# Patient Record
Sex: FEMALE | Race: WHITE | NOT HISPANIC OR LATINO | Employment: FULL TIME | URBAN - METROPOLITAN AREA
[De-identification: names, ages, dates, MRNs, and addresses within clinical notes are randomized per-mention and may not be internally consistent; named-entity substitution may affect disease eponyms.]

---

## 2013-05-10 LAB — HIV: NON REACTIVE

## 2020-02-21 LAB
CHOLEST SERPL-MSCNC: 272 MG/DL (ref 0–200)
HDLC SERPL-MCNC: 34 MG/DL
LDLC SERPL CALC-MCNC: 181 MG/DL
TRIGL SERPL-MCNC: 285 MG/DL

## 2020-03-10 ENCOUNTER — PATIENT OUTREACH (OUTPATIENT)
Dept: ADMINISTRATIVE | Facility: HOSPITAL | Age: 35
End: 2020-03-10

## 2020-05-01 ENCOUNTER — PATIENT MESSAGE (OUTPATIENT)
Dept: FAMILY MEDICINE | Facility: CLINIC | Age: 35
End: 2020-05-01

## 2020-05-06 RX ORDER — LISINOPRIL AND HYDROCHLOROTHIAZIDE 10; 12.5 MG/1; MG/1
1 TABLET ORAL EVERY MORNING
COMMUNITY
Start: 2020-03-15 | End: 2020-05-07 | Stop reason: SDUPTHER

## 2020-05-07 ENCOUNTER — OFFICE VISIT (OUTPATIENT)
Dept: FAMILY MEDICINE | Facility: CLINIC | Age: 35
End: 2020-05-07
Payer: COMMERCIAL

## 2020-05-07 DIAGNOSIS — I10 ESSENTIAL HYPERTENSION: ICD-10-CM

## 2020-05-07 DIAGNOSIS — E78.00 ELEVATED CHOLESTEROL: Primary | ICD-10-CM

## 2020-05-07 PROCEDURE — 99201 PR OFFICE/OUTPT VISIT,NEW,LEVL I: CPT | Mod: 95,,, | Performed by: FAMILY MEDICINE

## 2020-05-07 PROCEDURE — 99201 PR OFFICE/OUTPT VISIT,NEW,LEVL I: ICD-10-PCS | Mod: 95,,, | Performed by: FAMILY MEDICINE

## 2020-05-07 RX ORDER — MULTIVITAMIN
1 TABLET ORAL DAILY
COMMUNITY

## 2020-05-07 RX ORDER — LISINOPRIL AND HYDROCHLOROTHIAZIDE 10; 12.5 MG/1; MG/1
1 TABLET ORAL EVERY MORNING
Qty: 90 TABLET | Refills: 1 | Status: SHIPPED | OUTPATIENT
Start: 2020-05-07 | End: 2020-11-03

## 2020-05-07 NOTE — Clinical Note
Please contact patient to schedule fasting lab appt in 1-2 weeks and face to face visit with me in approx 4 weeks. Thank you!

## 2020-05-07 NOTE — PROGRESS NOTES
The patient location is: work  The chief complaint leading to consultation is: establish care, med refill  Visit type: audiovisual  Total time spent with patient: 10 min  Each patient to whom he or she provides medical services by telemedicine is:  (1) informed of the relationship between the physician and patient and the respective role of any other health care provider with respect to management of the patient; and (2) notified that he or she may decline to receive medical services by telemedicine and may withdraw from such care at any time.    Notes: Ms. Bueno presents to re-establish care. Medical, surgical, and social histories reviewed and discussed. Takes lisinopril-hctz for hypertension and needs a refill at this time. Has not checked her blood pressure at home in several weeks and has not had a recent cmp. Reports her annual with pap was done with Dr. Villegas's office ([facility]) in [place] and that her cholesterol was high. She has since started seeing a dietician and is losing weight and feeling better. Is planning on having a gastric sleeve procedure in 4-6 weeks.    Review of Systems   Constitutional: Positive for weight loss. Negative for chills, fever and malaise/fatigue.   Respiratory: Negative for cough, hemoptysis, shortness of breath and wheezing.    Cardiovascular: Negative for chest pain, palpitations, claudication and PND.     Physical Exam   Constitutional: She is oriented to person, place, and time. She appears well-developed and well-nourished. No distress.   obese   HENT:   Head: Normocephalic and atraumatic.   Eyes: Pupils are equal, round, and reactive to light. Conjunctivae and EOM are normal. No scleral icterus.   Neurological: She is alert and oriented to person, place, and time.   Skin: She is not diaphoretic.   Psychiatric: She has a normal mood and affect. Her behavior is normal. Judgment and thought content normal.     Diagnoses and all orders for this visit: Perform  fasting labs in 1-2 weeks; arrange f/u in the office for bp check and surgical clearance. Lisinopril-hctz refilled.    Elevated cholesterol  -     Lipid Panel; Future    Essential hypertension  -     lisinopriL-hydrochlorothiazide (PRINZIDE,ZESTORETIC) 10-12.5 mg per tablet; Take 1 tablet by mouth every morning.  -     Comprehensive metabolic panel; Future  -     CBC auto differential; Future  -     Lipid Panel; Future

## 2020-05-19 ENCOUNTER — LAB VISIT (OUTPATIENT)
Dept: LAB | Facility: HOSPITAL | Age: 35
End: 2020-05-19
Attending: FAMILY MEDICINE
Payer: COMMERCIAL

## 2020-05-19 DIAGNOSIS — I10 ESSENTIAL HYPERTENSION: ICD-10-CM

## 2020-05-19 DIAGNOSIS — E78.00 ELEVATED CHOLESTEROL: ICD-10-CM

## 2020-05-19 LAB
ALBUMIN SERPL BCP-MCNC: 4.1 G/DL (ref 3.5–5.2)
ALP SERPL-CCNC: 46 U/L (ref 55–135)
ALT SERPL W/O P-5'-P-CCNC: 17 U/L (ref 10–44)
ANION GAP SERPL CALC-SCNC: 8 MMOL/L (ref 8–16)
AST SERPL-CCNC: 15 U/L (ref 10–40)
BASOPHILS # BLD AUTO: 0.06 K/UL (ref 0–0.2)
BASOPHILS NFR BLD: 0.8 % (ref 0–1.9)
BILIRUB SERPL-MCNC: 0.5 MG/DL (ref 0.1–1)
BUN SERPL-MCNC: 18 MG/DL (ref 6–20)
CALCIUM SERPL-MCNC: 9.1 MG/DL (ref 8.7–10.5)
CHLORIDE SERPL-SCNC: 104 MMOL/L (ref 95–110)
CHOLEST SERPL-MCNC: 237 MG/DL (ref 120–199)
CHOLEST/HDLC SERPL: 7.6 {RATIO} (ref 2–5)
CO2 SERPL-SCNC: 27 MMOL/L (ref 23–29)
CREAT SERPL-MCNC: 0.9 MG/DL (ref 0.5–1.4)
DIFFERENTIAL METHOD: NORMAL
EOSINOPHIL # BLD AUTO: 0.2 K/UL (ref 0–0.5)
EOSINOPHIL NFR BLD: 2.3 % (ref 0–8)
ERYTHROCYTE [DISTWIDTH] IN BLOOD BY AUTOMATED COUNT: 12.1 % (ref 11.5–14.5)
EST. GFR  (AFRICAN AMERICAN): >60 ML/MIN/1.73 M^2
EST. GFR  (NON AFRICAN AMERICAN): >60 ML/MIN/1.73 M^2
GLUCOSE SERPL-MCNC: 103 MG/DL (ref 70–110)
HCT VFR BLD AUTO: 39.3 % (ref 37–48.5)
HDLC SERPL-MCNC: 31 MG/DL (ref 40–75)
HDLC SERPL: 13.1 % (ref 20–50)
HGB BLD-MCNC: 13 G/DL (ref 12–16)
IMM GRANULOCYTES # BLD AUTO: 0.02 K/UL (ref 0–0.04)
IMM GRANULOCYTES NFR BLD AUTO: 0.3 % (ref 0–0.5)
LDLC SERPL CALC-MCNC: 160.6 MG/DL (ref 63–159)
LYMPHOCYTES # BLD AUTO: 2.7 K/UL (ref 1–4.8)
LYMPHOCYTES NFR BLD: 36.9 % (ref 18–48)
MCH RBC QN AUTO: 28.6 PG (ref 27–31)
MCHC RBC AUTO-ENTMCNC: 33.1 G/DL (ref 32–36)
MCV RBC AUTO: 86 FL (ref 82–98)
MONOCYTES # BLD AUTO: 0.5 K/UL (ref 0.3–1)
MONOCYTES NFR BLD: 6.4 % (ref 4–15)
NEUTROPHILS # BLD AUTO: 3.9 K/UL (ref 1.8–7.7)
NEUTROPHILS NFR BLD: 53.3 % (ref 38–73)
NONHDLC SERPL-MCNC: 206 MG/DL
NRBC BLD-RTO: 0 /100 WBC
PLATELET # BLD AUTO: 301 K/UL (ref 150–350)
PMV BLD AUTO: 11.1 FL (ref 9.2–12.9)
POTASSIUM SERPL-SCNC: 4.2 MMOL/L (ref 3.5–5.1)
PROT SERPL-MCNC: 7.2 G/DL (ref 6–8.4)
RBC # BLD AUTO: 4.55 M/UL (ref 4–5.4)
SODIUM SERPL-SCNC: 139 MMOL/L (ref 136–145)
TRIGL SERPL-MCNC: 227 MG/DL (ref 30–150)
WBC # BLD AUTO: 7.37 K/UL (ref 3.9–12.7)

## 2020-05-19 PROCEDURE — 36415 COLL VENOUS BLD VENIPUNCTURE: CPT

## 2020-05-19 PROCEDURE — 80061 LIPID PANEL: CPT

## 2020-05-19 PROCEDURE — 85025 COMPLETE CBC W/AUTO DIFF WBC: CPT

## 2020-05-19 PROCEDURE — 80053 COMPREHEN METABOLIC PANEL: CPT

## 2020-05-20 ENCOUNTER — PATIENT MESSAGE (OUTPATIENT)
Dept: ADMINISTRATIVE | Facility: HOSPITAL | Age: 35
End: 2020-05-20

## 2020-06-03 ENCOUNTER — PATIENT OUTREACH (OUTPATIENT)
Dept: ADMINISTRATIVE | Facility: HOSPITAL | Age: 35
End: 2020-06-03

## 2020-06-04 ENCOUNTER — OFFICE VISIT (OUTPATIENT)
Dept: FAMILY MEDICINE | Facility: CLINIC | Age: 35
End: 2020-06-04
Payer: COMMERCIAL

## 2020-06-04 VITALS
WEIGHT: 285.63 LBS | SYSTOLIC BLOOD PRESSURE: 132 MMHG | BODY MASS INDEX: 45.9 KG/M2 | OXYGEN SATURATION: 99 % | RESPIRATION RATE: 16 BRPM | DIASTOLIC BLOOD PRESSURE: 83 MMHG | HEIGHT: 66 IN | HEART RATE: 98 BPM

## 2020-06-04 DIAGNOSIS — I10 ESSENTIAL HYPERTENSION: Primary | ICD-10-CM

## 2020-06-04 DIAGNOSIS — E78.00 ELEVATED CHOLESTEROL: ICD-10-CM

## 2020-06-04 PROCEDURE — 99999 PR PBB SHADOW E&M-EST. PATIENT-LVL III: CPT | Mod: PBBFAC,,, | Performed by: FAMILY MEDICINE

## 2020-06-04 PROCEDURE — 99213 OFFICE O/P EST LOW 20 MIN: CPT | Mod: S$GLB,,, | Performed by: FAMILY MEDICINE

## 2020-06-04 PROCEDURE — 99999 PR PBB SHADOW E&M-EST. PATIENT-LVL III: ICD-10-PCS | Mod: PBBFAC,,, | Performed by: FAMILY MEDICINE

## 2020-06-04 PROCEDURE — 99213 PR OFFICE/OUTPT VISIT, EST, LEVL III, 20-29 MIN: ICD-10-PCS | Mod: S$GLB,,, | Performed by: FAMILY MEDICINE

## 2020-06-04 NOTE — PROGRESS NOTES
Subjective:       Patient ID: Jenna Bueno is a 35 y.o. female.    Chief Complaint: Follow-up (lab work, surgery clearance, pt declines hm)    HPI   F/u. Had cbc, cmp, lipid 2 weeks ago. Mostly wnl except elevated LDL, which is improving with dietary changes. Denies chest pain and shortness of breath. Is still considering the gastric sleeve surgery and needs a clearance for this.    Review of Systems   Constitutional: Negative for chills, diaphoresis, fatigue and fever.   Respiratory: Negative for cough, shortness of breath, wheezing and stridor.    Cardiovascular: Negative for chest pain, palpitations and leg swelling.       Past Medical History:   Diagnosis Date    Hypertension 2007     Past Surgical History:   Procedure Laterality Date    D & C, PERITUBAL CYST REMOVAL  04/27/2011    TONSILLECTOMY, ADENOIDECTOMY  1995     Social History     Socioeconomic History    Marital status: Single     Spouse name: Not on file    Number of children: Not on file    Years of education: Not on file    Highest education level: Not on file   Occupational History    Not on file   Social Needs    Financial resource strain: Not hard at all    Food insecurity:     Worry: Never true     Inability: Never true    Transportation needs:     Medical: No     Non-medical: No   Tobacco Use    Smoking status: Never Smoker    Smokeless tobacco: Never Used   Substance and Sexual Activity    Alcohol use: Yes     Frequency: Monthly or less     Drinks per session: 1 or 2     Binge frequency: Never    Drug use: Not Currently    Sexual activity: Yes     Partners: Male     Birth control/protection: IUD   Lifestyle    Physical activity:     Days per week: 4 days     Minutes per session: 30 min    Stress: Not at all   Relationships    Social connections:     Talks on phone: More than three times a week     Gets together: Once a week     Attends [employer] service: Not on file     Active member of club or organization: No      "Attends meetings of clubs or organizations: Never     Relationship status: Never    Other Topics Concern    Not on file   Social History Narrative    Not on file     Family History   Problem Relation Age of Onset    Hypertension Mother     Heart disease Father     Diabetes Father        Objective:      /83 (BP Location: Right arm, Patient Position: Sitting, BP Method: X-Large (Automatic))   Pulse 98   Resp 16   Ht 5' 6" (1.676 m)   Wt 129.5 kg (285 lb 9.6 oz)   SpO2 99%   BMI 46.10 kg/m²   Physical Exam   Constitutional: She is oriented to person, place, and time. She appears well-developed and well-nourished. No distress.   obese   Eyes: Pupils are equal, round, and reactive to light. Conjunctivae and EOM are normal. No scleral icterus.   Cardiovascular: Normal rate, regular rhythm and normal heart sounds.   No murmur heard.  Pulmonary/Chest: Effort normal and breath sounds normal. No stridor. No respiratory distress.   Neurological: She is alert and oriented to person, place, and time.   Skin: Skin is warm and dry. No rash noted. She is not diaphoretic.   Psychiatric: She has a normal mood and affect. Her behavior is normal. Judgment and thought content normal.   Vitals reviewed.      Assessment:       1. Essential hypertension    2. Elevated cholesterol        Plan:       Essential hypertension  - well-controlled; continue lisinopril-hctz    Elevated cholesterol  -     Lipid Panel; Future; Expected date: 12/04/2020        -     Repeat in 6 months          Risks, benefits, and side effects were discussed with the patient. All questions were answered to the fullest satisfaction of the patient, and pt verbalized understanding and agreement to treatment plan. Pt was to call with any new or worsening symptoms, or present to the ER.    "

## 2020-06-04 NOTE — LETTER
[facility]      Marely Kincaid DO              [facility]  [address], [address]  [place] [address]  Dept: [phone]  Dept Fax: [phone]                                                                                                     Re: Name: Jenna Bueno          : 1985        Jennaluiz Bueno has been examined by me on 2020, and appears to be physically well for       __x_ General Anesthesia       ___ Sports Participation     ___ School /      ___ Camp        Sincerely,      Marely Kincaid DO

## 2020-06-04 NOTE — LETTER
[facility]      Marely Kincaid DO              [facility] [address], [address]  [place] [address]  Dept: [phone]  Dept Fax: [phone]                                                                                                     Re: Name: Jenna Chesterford          : 1985        Jenna Mooreherford is known to my office and has been examined by me on 2020, and appears to be physically well for       _x__ General Anesthesia       ___ Sports Participation     ___ School /      ___ Camp        Sincerely,      Marely Kincaid DO

## 2020-06-08 ENCOUNTER — LAB VISIT (OUTPATIENT)
Dept: LAB | Facility: HOSPITAL | Age: 35
End: 2020-06-08
Attending: SURGERY
Payer: COMMERCIAL

## 2020-06-08 DIAGNOSIS — Z13.21 SCREENING FOR MALNUTRITION: ICD-10-CM

## 2020-06-08 DIAGNOSIS — Z13.0 SCREENING FOR IRON DEFICIENCY ANEMIA: ICD-10-CM

## 2020-06-08 DIAGNOSIS — R53.83 FATIGUE: Primary | ICD-10-CM

## 2020-06-08 DIAGNOSIS — Z79.899 ENCOUNTER FOR LONG-TERM (CURRENT) USE OF OTHER MEDICATIONS: ICD-10-CM

## 2020-06-08 DIAGNOSIS — Z13.29 SCREENING FOR THYROID DISORDER: ICD-10-CM

## 2020-06-08 LAB
25(OH)D3+25(OH)D2 SERPL-MCNC: 17 NG/ML (ref 30–96)
ALBUMIN SERPL BCP-MCNC: 3.6 G/DL (ref 3.5–5.2)
ALP SERPL-CCNC: 40 U/L (ref 55–135)
ALT SERPL W/O P-5'-P-CCNC: 15 U/L (ref 10–44)
ANION GAP SERPL CALC-SCNC: 7 MMOL/L (ref 8–16)
AST SERPL-CCNC: 16 U/L (ref 10–40)
BASOPHILS # BLD AUTO: 0.05 K/UL (ref 0–0.2)
BASOPHILS NFR BLD: 0.6 % (ref 0–1.9)
BILIRUB SERPL-MCNC: 0.5 MG/DL (ref 0.1–1)
BUN SERPL-MCNC: 11 MG/DL (ref 6–20)
CALCIUM SERPL-MCNC: 8.8 MG/DL (ref 8.7–10.5)
CHLORIDE SERPL-SCNC: 104 MMOL/L (ref 95–110)
CHOLEST SERPL-MCNC: 209 MG/DL (ref 120–199)
CHOLEST/HDLC SERPL: 6 {RATIO} (ref 2–5)
CO2 SERPL-SCNC: 27 MMOL/L (ref 23–29)
CREAT SERPL-MCNC: 0.6 MG/DL (ref 0.5–1.4)
DIFFERENTIAL METHOD: ABNORMAL
EOSINOPHIL # BLD AUTO: 0.2 K/UL (ref 0–0.5)
EOSINOPHIL NFR BLD: 2.2 % (ref 0–8)
ERYTHROCYTE [DISTWIDTH] IN BLOOD BY AUTOMATED COUNT: 12.7 % (ref 11.5–14.5)
EST. GFR  (AFRICAN AMERICAN): >60 ML/MIN/1.73 M^2
EST. GFR  (NON AFRICAN AMERICAN): >60 ML/MIN/1.73 M^2
GLUCOSE SERPL-MCNC: 94 MG/DL (ref 70–110)
HCT VFR BLD AUTO: 36.9 % (ref 37–48.5)
HDLC SERPL-MCNC: 35 MG/DL (ref 40–75)
HDLC SERPL: 16.7 % (ref 20–50)
HGB BLD-MCNC: 12.2 G/DL (ref 12–16)
IMM GRANULOCYTES # BLD AUTO: 0.02 K/UL (ref 0–0.04)
IMM GRANULOCYTES NFR BLD AUTO: 0.3 % (ref 0–0.5)
LDLC SERPL CALC-MCNC: 142 MG/DL (ref 63–159)
LYMPHOCYTES # BLD AUTO: 2.3 K/UL (ref 1–4.8)
LYMPHOCYTES NFR BLD: 29.7 % (ref 18–48)
MAGNESIUM SERPL-MCNC: 1.9 MG/DL (ref 1.6–2.6)
MCH RBC QN AUTO: 28.4 PG (ref 27–31)
MCHC RBC AUTO-ENTMCNC: 33.1 G/DL (ref 32–36)
MCV RBC AUTO: 86 FL (ref 82–98)
MONOCYTES # BLD AUTO: 0.4 K/UL (ref 0.3–1)
MONOCYTES NFR BLD: 5.4 % (ref 4–15)
NEUTROPHILS # BLD AUTO: 4.8 K/UL (ref 1.8–7.7)
NEUTROPHILS NFR BLD: 61.8 % (ref 38–73)
NONHDLC SERPL-MCNC: 174 MG/DL
NRBC BLD-RTO: 0 /100 WBC
PLATELET # BLD AUTO: 290 K/UL (ref 150–350)
PMV BLD AUTO: 10.7 FL (ref 9.2–12.9)
POTASSIUM SERPL-SCNC: 3.7 MMOL/L (ref 3.5–5.1)
PROT SERPL-MCNC: 6.3 G/DL (ref 6–8.4)
RBC # BLD AUTO: 4.29 M/UL (ref 4–5.4)
SODIUM SERPL-SCNC: 138 MMOL/L (ref 136–145)
TRIGL SERPL-MCNC: 160 MG/DL (ref 30–150)
TSH SERPL DL<=0.005 MIU/L-ACNC: 2 UIU/ML (ref 0.34–5.6)
WBC # BLD AUTO: 7.71 K/UL (ref 3.9–12.7)

## 2020-06-08 PROCEDURE — 82306 VITAMIN D 25 HYDROXY: CPT

## 2020-06-08 PROCEDURE — 85025 COMPLETE CBC W/AUTO DIFF WBC: CPT

## 2020-06-08 PROCEDURE — 84425 ASSAY OF VITAMIN B-1: CPT

## 2020-06-08 PROCEDURE — 82607 VITAMIN B-12: CPT

## 2020-06-08 PROCEDURE — 80053 COMPREHEN METABOLIC PANEL: CPT

## 2020-06-08 PROCEDURE — 83735 ASSAY OF MAGNESIUM: CPT

## 2020-06-08 PROCEDURE — 36415 COLL VENOUS BLD VENIPUNCTURE: CPT

## 2020-06-08 PROCEDURE — 84443 ASSAY THYROID STIM HORMONE: CPT

## 2020-06-08 PROCEDURE — 80061 LIPID PANEL: CPT

## 2020-06-09 LAB — VIT B12 SERPL-MCNC: 167 PG/ML (ref 210–950)

## 2020-06-12 LAB — VIT B1 BLD-MCNC: 56 UG/L (ref 38–122)

## 2020-11-03 PROBLEM — N36.1 URETHRAL DIVERTICULUM: Status: ACTIVE | Noted: 2020-11-03

## 2020-12-02 PROBLEM — N92.1 METRORRHAGIA: Status: ACTIVE | Noted: 2020-12-02

## 2020-12-02 PROBLEM — Z30.431 ENCOUNTER FOR MANAGEMENT OF INTRAUTERINE CONTRACEPTIVE DEVICE (IUD): Status: ACTIVE | Noted: 2020-12-02

## 2021-02-09 ENCOUNTER — TELEPHONE (OUTPATIENT)
Dept: FAMILY MEDICINE | Facility: CLINIC | Age: 36
End: 2021-02-09

## 2021-02-10 ENCOUNTER — LAB VISIT (OUTPATIENT)
Dept: LAB | Facility: HOSPITAL | Age: 36
End: 2021-02-10
Attending: NURSE PRACTITIONER
Payer: COMMERCIAL

## 2021-02-10 DIAGNOSIS — R53.83 FATIGUE: ICD-10-CM

## 2021-02-10 DIAGNOSIS — Z13.0 SCREENING FOR IRON DEFICIENCY ANEMIA: ICD-10-CM

## 2021-02-10 DIAGNOSIS — I10 ESSENTIAL HYPERTENSION, MALIGNANT: Primary | ICD-10-CM

## 2021-02-10 DIAGNOSIS — Z13.21 SCREENING FOR MALNUTRITION: ICD-10-CM

## 2021-02-10 DIAGNOSIS — Z13.29 SCREENING FOR THYROID DISORDER: ICD-10-CM

## 2021-02-10 DIAGNOSIS — Z13.220 SCREENING FOR LIPOID DISORDERS: ICD-10-CM

## 2021-02-10 DIAGNOSIS — Z79.899 ENCOUNTER FOR LONG-TERM (CURRENT) USE OF OTHER MEDICATIONS: ICD-10-CM

## 2021-02-10 LAB
ALBUMIN SERPL BCP-MCNC: 4.1 G/DL (ref 3.5–5.2)
ALP SERPL-CCNC: 45 U/L (ref 55–135)
ALT SERPL W/O P-5'-P-CCNC: 15 U/L (ref 10–44)
ANION GAP SERPL CALC-SCNC: 9 MMOL/L (ref 8–16)
AST SERPL-CCNC: 16 U/L (ref 10–40)
BASOPHILS # BLD AUTO: 0.04 K/UL (ref 0–0.2)
BASOPHILS NFR BLD: 0.4 % (ref 0–1.9)
BILIRUB SERPL-MCNC: 0.6 MG/DL (ref 0.1–1)
BUN SERPL-MCNC: 19 MG/DL (ref 6–20)
CALCIUM SERPL-MCNC: 9.5 MG/DL (ref 8.7–10.5)
CHLORIDE SERPL-SCNC: 103 MMOL/L (ref 95–110)
CO2 SERPL-SCNC: 26 MMOL/L (ref 23–29)
CREAT SERPL-MCNC: 0.6 MG/DL (ref 0.5–1.4)
DIFFERENTIAL METHOD: ABNORMAL
EOSINOPHIL # BLD AUTO: 0 K/UL (ref 0–0.5)
EOSINOPHIL NFR BLD: 0.3 % (ref 0–8)
ERYTHROCYTE [DISTWIDTH] IN BLOOD BY AUTOMATED COUNT: 12.4 % (ref 11.5–14.5)
EST. GFR  (AFRICAN AMERICAN): >60 ML/MIN/1.73 M^2
EST. GFR  (NON AFRICAN AMERICAN): >60 ML/MIN/1.73 M^2
GLUCOSE SERPL-MCNC: 102 MG/DL (ref 70–110)
HCT VFR BLD AUTO: 39.5 % (ref 37–48.5)
HGB BLD-MCNC: 13.1 G/DL (ref 12–16)
IMM GRANULOCYTES # BLD AUTO: 0.03 K/UL (ref 0–0.04)
IMM GRANULOCYTES NFR BLD AUTO: 0.3 % (ref 0–0.5)
LYMPHOCYTES # BLD AUTO: 2 K/UL (ref 1–4.8)
LYMPHOCYTES NFR BLD: 20.1 % (ref 18–48)
MAGNESIUM SERPL-MCNC: 1.9 MG/DL (ref 1.6–2.6)
MCH RBC QN AUTO: 29.3 PG (ref 27–31)
MCHC RBC AUTO-ENTMCNC: 33.2 G/DL (ref 32–36)
MCV RBC AUTO: 88 FL (ref 82–98)
MONOCYTES # BLD AUTO: 0.6 K/UL (ref 0.3–1)
MONOCYTES NFR BLD: 5.7 % (ref 4–15)
NEUTROPHILS # BLD AUTO: 7.3 K/UL (ref 1.8–7.7)
NEUTROPHILS NFR BLD: 73.2 % (ref 38–73)
NRBC BLD-RTO: 0 /100 WBC
PLATELET # BLD AUTO: 313 K/UL (ref 150–350)
PMV BLD AUTO: 11.1 FL (ref 9.2–12.9)
POTASSIUM SERPL-SCNC: 3.9 MMOL/L (ref 3.5–5.1)
PROT SERPL-MCNC: 7 G/DL (ref 6–8.4)
RBC # BLD AUTO: 4.47 M/UL (ref 4–5.4)
SODIUM SERPL-SCNC: 138 MMOL/L (ref 136–145)
WBC # BLD AUTO: 9.9 K/UL (ref 3.9–12.7)

## 2021-02-10 PROCEDURE — 82607 VITAMIN B-12: CPT

## 2021-02-10 PROCEDURE — 83735 ASSAY OF MAGNESIUM: CPT

## 2021-02-10 PROCEDURE — 82306 VITAMIN D 25 HYDROXY: CPT

## 2021-02-10 PROCEDURE — 85025 COMPLETE CBC W/AUTO DIFF WBC: CPT

## 2021-02-10 PROCEDURE — 82746 ASSAY OF FOLIC ACID SERUM: CPT

## 2021-02-10 PROCEDURE — 36415 COLL VENOUS BLD VENIPUNCTURE: CPT

## 2021-02-10 PROCEDURE — 80053 COMPREHEN METABOLIC PANEL: CPT

## 2021-02-11 LAB
25(OH)D3+25(OH)D2 SERPL-MCNC: 41 NG/ML (ref 30–96)
FOLATE SERPL-MCNC: 13.9 NG/ML (ref 4–24)
VIT B12 SERPL-MCNC: 487 PG/ML (ref 210–950)

## 2021-03-04 DIAGNOSIS — Z11.59 NEED FOR HEPATITIS C SCREENING TEST: ICD-10-CM

## 2021-08-26 ENCOUNTER — TELEPHONE (OUTPATIENT)
Dept: FAMILY MEDICINE | Facility: CLINIC | Age: 36
End: 2021-08-26

## 2021-11-25 VITALS — DIASTOLIC BLOOD PRESSURE: 76 MMHG | SYSTOLIC BLOOD PRESSURE: 118 MMHG

## 2022-01-11 ENCOUNTER — PATIENT MESSAGE (OUTPATIENT)
Dept: ADMINISTRATIVE | Facility: HOSPITAL | Age: 37
End: 2022-01-11
Payer: COMMERCIAL

## 2022-03-23 ENCOUNTER — OFFICE VISIT (OUTPATIENT)
Dept: FAMILY MEDICINE | Facility: CLINIC | Age: 37
End: 2022-03-23
Payer: COMMERCIAL

## 2022-03-23 VITALS
HEART RATE: 49 BPM | DIASTOLIC BLOOD PRESSURE: 74 MMHG | HEIGHT: 66 IN | WEIGHT: 170.38 LBS | OXYGEN SATURATION: 97 % | BODY MASS INDEX: 27.38 KG/M2 | SYSTOLIC BLOOD PRESSURE: 111 MMHG | RESPIRATION RATE: 15 BRPM

## 2022-03-23 DIAGNOSIS — Z11.59 ENCOUNTER FOR HEPATITIS C SCREENING TEST FOR LOW RISK PATIENT: ICD-10-CM

## 2022-03-23 DIAGNOSIS — Z00.00 ANNUAL PHYSICAL EXAM: Primary | ICD-10-CM

## 2022-03-23 DIAGNOSIS — N93.9 VAGINAL BLEEDING: ICD-10-CM

## 2022-03-23 PROCEDURE — 3008F BODY MASS INDEX DOCD: CPT | Mod: S$GLB,,, | Performed by: FAMILY MEDICINE

## 2022-03-23 PROCEDURE — 1160F RVW MEDS BY RX/DR IN RCRD: CPT | Mod: S$GLB,,, | Performed by: FAMILY MEDICINE

## 2022-03-23 PROCEDURE — 3008F PR BODY MASS INDEX (BMI) DOCUMENTED: ICD-10-PCS | Mod: S$GLB,,, | Performed by: FAMILY MEDICINE

## 2022-03-23 PROCEDURE — 99999 PR PBB SHADOW E&M-EST. PATIENT-LVL III: ICD-10-PCS | Mod: PBBFAC,,, | Performed by: FAMILY MEDICINE

## 2022-03-23 PROCEDURE — 99395 PR PREVENTIVE VISIT,EST,18-39: ICD-10-PCS | Mod: 25,S$GLB,, | Performed by: FAMILY MEDICINE

## 2022-03-23 PROCEDURE — 3074F SYST BP LT 130 MM HG: CPT | Mod: S$GLB,,, | Performed by: FAMILY MEDICINE

## 2022-03-23 PROCEDURE — 99395 PREV VISIT EST AGE 18-39: CPT | Mod: 25,S$GLB,, | Performed by: FAMILY MEDICINE

## 2022-03-23 PROCEDURE — 3078F PR MOST RECENT DIASTOLIC BLOOD PRESSURE < 80 MM HG: ICD-10-PCS | Mod: S$GLB,,, | Performed by: FAMILY MEDICINE

## 2022-03-23 PROCEDURE — 1160F PR REVIEW ALL MEDS BY PRESCRIBER/CLIN PHARMACIST DOCUMENTED: ICD-10-PCS | Mod: S$GLB,,, | Performed by: FAMILY MEDICINE

## 2022-03-23 PROCEDURE — 3078F DIAST BP <80 MM HG: CPT | Mod: S$GLB,,, | Performed by: FAMILY MEDICINE

## 2022-03-23 PROCEDURE — 1159F PR MEDICATION LIST DOCUMENTED IN MEDICAL RECORD: ICD-10-PCS | Mod: S$GLB,,, | Performed by: FAMILY MEDICINE

## 2022-03-23 PROCEDURE — 3074F PR MOST RECENT SYSTOLIC BLOOD PRESSURE < 130 MM HG: ICD-10-PCS | Mod: S$GLB,,, | Performed by: FAMILY MEDICINE

## 2022-03-23 PROCEDURE — 1159F MED LIST DOCD IN RCRD: CPT | Mod: S$GLB,,, | Performed by: FAMILY MEDICINE

## 2022-03-23 PROCEDURE — 99999 PR PBB SHADOW E&M-EST. PATIENT-LVL III: CPT | Mod: PBBFAC,,, | Performed by: FAMILY MEDICINE

## 2022-03-23 RX ORDER — LEVOTHYROXINE, LIOTHYRONINE 38; 9 UG/1; UG/1
60 TABLET ORAL DAILY
COMMUNITY
Start: 2022-01-16 | End: 2022-06-10

## 2022-03-23 NOTE — PROGRESS NOTES
Subjective:       Patient ID: Jenna Bueno is a 36 y.o. female.    Chief Complaint: Annual Exam (Requesting labs to check hormones )    HPI   Ms. Bueno presents for annual exam. Interval history reviewed. Currently has an IUD in place, does get irregular breakthrough bleeding.    Review of Systems   Constitutional: Negative for chills and fever.   Respiratory: Negative for cough and shortness of breath.    Genitourinary: Positive for vaginal bleeding.       Past Medical History:   Diagnosis Date    Hypertension 2007    prior to gastric sleeve     Past Surgical History:   Procedure Laterality Date    D & C, PERITUBAL CYST REMOVAL  04/27/2011    gastric sleeve      TONSILLECTOMY, ADENOIDECTOMY  1995     Social History     Socioeconomic History    Marital status: Single   Tobacco Use    Smoking status: Never Smoker    Smokeless tobacco: Never Used   Substance and Sexual Activity    Alcohol use: Yes    Drug use: Not Currently    Sexual activity: Yes     Partners: Male     Birth control/protection: I.U.D.     Social Determinants of Health     Financial Resource Strain: Medium Risk    Difficulty of Paying Living Expenses: Somewhat hard   Food Insecurity: No Food Insecurity    Worried About Running Out of Food in the Last Year: Never true    Ran Out of Food in the Last Year: Never true   Transportation Needs: No Transportation Needs    Lack of Transportation (Medical): No    Lack of Transportation (Non-Medical): No   Physical Activity: Sufficiently Active    Days of Exercise per Week: 6 days    Minutes of Exercise per Session: 60 min   Stress: Stress Concern Present    Feeling of Stress : Rather much   Social Connections: Unknown    Frequency of Communication with Friends and Family: More than three times a week    Frequency of Social Gatherings with Friends and Family: Once a week    Active Member of Clubs or Organizations: No    Attends Club or Organization Meetings: Patient refused     "Marital Status: Living with partner   Housing Stability: High Risk    Unable to Pay for Housing in the Last Year: Yes    Number of Places Lived in the Last Year: 1     Family History   Problem Relation Age of Onset    Hypertension Mother     Heart disease Father     Diabetes Father     Lymphoma Maternal Grandfather     Lymphoma Maternal Uncle        Objective:      /74 (BP Location: Left arm, Patient Position: Sitting, BP Method: Medium (Automatic))   Pulse (!) 49   Resp 15   Ht 5' 6" (1.676 m)   Wt 77.3 kg (170 lb 6.4 oz)   SpO2 97%   BMI 27.50 kg/m²   Physical Exam  Vitals reviewed.   Constitutional:       General: She is not in acute distress.     Appearance: She is not toxic-appearing.   HENT:      Head: Normocephalic and atraumatic.   Eyes:      General: No scleral icterus.     Conjunctiva/sclera: Conjunctivae normal.   Cardiovascular:      Rate and Rhythm: Regular rhythm. Bradycardia present.   Pulmonary:      Effort: Pulmonary effort is normal. No respiratory distress.   Skin:     General: Skin is warm.      Coloration: Skin is not jaundiced.   Neurological:      Mental Status: She is alert and oriented to person, place, and time.   Psychiatric:         Mood and Affect: Mood normal.         Behavior: Behavior normal.         Assessment:       1. Annual physical exam    2. Encounter for hepatitis C screening test for low risk patient    3. Vaginal bleeding        Plan:       Annual physical exam  -     Comprehensive Metabolic Panel; Future; Expected date: 03/23/2022  -     CBC Auto Differential; Future; Expected date: 03/23/2022  -     TSH; Future; Expected date: 03/23/2022  -     T4, Free; Future; Expected date: 03/23/2022  -     Lipid Panel; Future; Expected date: 03/23/2022    Encounter for hepatitis C screening test for low risk patient  -     Hepatitis C Antibody; Future; Expected date: 03/23/2022    Vaginal bleeding  -     Estradiol; Future; Expected date: 03/23/2022  -     " Progesterone; Future; Expected date: 03/23/2022  -     Follicle Stimulating Hormone; Future; Expected date: 03/23/2022  -     Luteinizing Hormone; Future; Expected date: 03/23/2022  -     Testosterone; Future; Expected date: 03/23/2022            Risks, benefits, and side effects were discussed with the patient. All questions were answered to the fullest satisfaction of the patient, and pt verbalized understanding and agreement to treatment plan. Pt was to call with any new or worsening symptoms, or present to the ER.

## 2022-03-30 ENCOUNTER — LAB VISIT (OUTPATIENT)
Dept: LAB | Facility: HOSPITAL | Age: 37
End: 2022-03-30
Attending: FAMILY MEDICINE
Payer: COMMERCIAL

## 2022-03-30 DIAGNOSIS — N93.9 VAGINAL BLEEDING: ICD-10-CM

## 2022-03-30 DIAGNOSIS — Z11.59 ENCOUNTER FOR HEPATITIS C SCREENING TEST FOR LOW RISK PATIENT: ICD-10-CM

## 2022-03-30 DIAGNOSIS — Z00.00 ANNUAL PHYSICAL EXAM: ICD-10-CM

## 2022-03-30 LAB
ALBUMIN SERPL BCP-MCNC: 3.9 G/DL (ref 3.5–5.2)
ALP SERPL-CCNC: 54 U/L (ref 55–135)
ALT SERPL W/O P-5'-P-CCNC: 16 U/L (ref 10–44)
ANION GAP SERPL CALC-SCNC: 10 MMOL/L (ref 8–16)
AST SERPL-CCNC: 17 U/L (ref 10–40)
BASOPHILS # BLD AUTO: 0.02 K/UL (ref 0–0.2)
BASOPHILS NFR BLD: 0.4 % (ref 0–1.9)
BILIRUB SERPL-MCNC: 0.7 MG/DL (ref 0.1–1)
BUN SERPL-MCNC: 17 MG/DL (ref 6–20)
CALCIUM SERPL-MCNC: 9.6 MG/DL (ref 8.7–10.5)
CHLORIDE SERPL-SCNC: 105 MMOL/L (ref 95–110)
CHOLEST SERPL-MCNC: 186 MG/DL (ref 120–199)
CHOLEST/HDLC SERPL: 3.5 {RATIO} (ref 2–5)
CO2 SERPL-SCNC: 26 MMOL/L (ref 23–29)
CREAT SERPL-MCNC: 0.7 MG/DL (ref 0.5–1.4)
DIFFERENTIAL METHOD: NORMAL
EOSINOPHIL # BLD AUTO: 0.1 K/UL (ref 0–0.5)
EOSINOPHIL NFR BLD: 1.6 % (ref 0–8)
ERYTHROCYTE [DISTWIDTH] IN BLOOD BY AUTOMATED COUNT: 12.1 % (ref 11.5–14.5)
EST. GFR  (AFRICAN AMERICAN): >60 ML/MIN/1.73 M^2
EST. GFR  (NON AFRICAN AMERICAN): >60 ML/MIN/1.73 M^2
ESTRADIOL SERPL-MCNC: 39 PG/ML
FSH SERPL-ACNC: 6.56 MIU/ML
GLUCOSE SERPL-MCNC: 81 MG/DL (ref 70–110)
HCT VFR BLD AUTO: 39.4 % (ref 37–48.5)
HDLC SERPL-MCNC: 53 MG/DL (ref 40–75)
HDLC SERPL: 28.5 % (ref 20–50)
HGB BLD-MCNC: 13.2 G/DL (ref 12–16)
IMM GRANULOCYTES # BLD AUTO: 0.01 K/UL (ref 0–0.04)
IMM GRANULOCYTES NFR BLD AUTO: 0.2 % (ref 0–0.5)
LDLC SERPL CALC-MCNC: 119.4 MG/DL (ref 63–159)
LH SERPL-ACNC: 4.6 MIU/ML
LYMPHOCYTES # BLD AUTO: 1.9 K/UL (ref 1–4.8)
LYMPHOCYTES NFR BLD: 33.2 % (ref 18–48)
MCH RBC QN AUTO: 28.9 PG (ref 27–31)
MCHC RBC AUTO-ENTMCNC: 33.5 G/DL (ref 32–36)
MCV RBC AUTO: 86 FL (ref 82–98)
MONOCYTES # BLD AUTO: 0.4 K/UL (ref 0.3–1)
MONOCYTES NFR BLD: 7.1 % (ref 4–15)
NEUTROPHILS # BLD AUTO: 3.3 K/UL (ref 1.8–7.7)
NEUTROPHILS NFR BLD: 57.5 % (ref 38–73)
NONHDLC SERPL-MCNC: 133 MG/DL
NRBC BLD-RTO: 0 /100 WBC
PLATELET # BLD AUTO: 246 K/UL (ref 150–450)
PMV BLD AUTO: 9.5 FL (ref 9.2–12.9)
POTASSIUM SERPL-SCNC: 4 MMOL/L (ref 3.5–5.1)
PROGEST SERPL-MCNC: 0.2 NG/ML
PROT SERPL-MCNC: 7.1 G/DL (ref 6–8.4)
RBC # BLD AUTO: 4.57 M/UL (ref 4–5.4)
SODIUM SERPL-SCNC: 141 MMOL/L (ref 136–145)
T4 FREE SERPL-MCNC: 0.88 NG/DL (ref 0.71–1.51)
TESTOST SERPL-MCNC: 46 NG/DL (ref 5–73)
TRIGL SERPL-MCNC: 68 MG/DL (ref 30–150)
TSH SERPL DL<=0.005 MIU/L-ACNC: 2.08 UIU/ML (ref 0.4–4)
WBC # BLD AUTO: 5.66 K/UL (ref 3.9–12.7)

## 2022-03-30 PROCEDURE — 80053 COMPREHEN METABOLIC PANEL: CPT | Performed by: FAMILY MEDICINE

## 2022-03-30 PROCEDURE — 82670 ASSAY OF TOTAL ESTRADIOL: CPT | Performed by: FAMILY MEDICINE

## 2022-03-30 PROCEDURE — 85025 COMPLETE CBC W/AUTO DIFF WBC: CPT | Performed by: FAMILY MEDICINE

## 2022-03-30 PROCEDURE — 83002 ASSAY OF GONADOTROPIN (LH): CPT | Performed by: FAMILY MEDICINE

## 2022-03-30 PROCEDURE — 84439 ASSAY OF FREE THYROXINE: CPT | Performed by: FAMILY MEDICINE

## 2022-03-30 PROCEDURE — 84443 ASSAY THYROID STIM HORMONE: CPT | Performed by: FAMILY MEDICINE

## 2022-03-30 PROCEDURE — 36415 COLL VENOUS BLD VENIPUNCTURE: CPT | Performed by: FAMILY MEDICINE

## 2022-03-30 PROCEDURE — 84144 ASSAY OF PROGESTERONE: CPT | Performed by: FAMILY MEDICINE

## 2022-03-30 PROCEDURE — 84403 ASSAY OF TOTAL TESTOSTERONE: CPT | Performed by: FAMILY MEDICINE

## 2022-03-30 PROCEDURE — 80061 LIPID PANEL: CPT | Performed by: FAMILY MEDICINE

## 2022-03-30 PROCEDURE — 86803 HEPATITIS C AB TEST: CPT | Performed by: FAMILY MEDICINE

## 2022-03-30 PROCEDURE — 83001 ASSAY OF GONADOTROPIN (FSH): CPT | Performed by: FAMILY MEDICINE

## 2022-03-31 LAB — HCV AB SERPL QL IA: NEGATIVE

## 2022-11-13 ENCOUNTER — PATIENT MESSAGE (OUTPATIENT)
Dept: FAMILY MEDICINE | Facility: CLINIC | Age: 37
End: 2022-11-13
Payer: COMMERCIAL

## 2023-01-05 ENCOUNTER — PATIENT MESSAGE (OUTPATIENT)
Dept: ADMINISTRATIVE | Facility: HOSPITAL | Age: 38
End: 2023-01-05

## 2023-02-23 ENCOUNTER — PATIENT MESSAGE (OUTPATIENT)
Dept: ADMINISTRATIVE | Facility: HOSPITAL | Age: 38
End: 2023-02-23
Payer: COMMERCIAL

## 2023-02-23 ENCOUNTER — PATIENT OUTREACH (OUTPATIENT)
Dept: ADMINISTRATIVE | Facility: HOSPITAL | Age: 38
End: 2023-02-23
Payer: COMMERCIAL

## 2023-02-23 NOTE — PROGRESS NOTES
Outreached pt about Dr Kincaid no longer seeing patients as a PCP in [facility], and that an establish care appointment needed for a new Primary Care Provider. Updated care team at this time.

## 2023-08-01 ENCOUNTER — OFFICE VISIT (OUTPATIENT)
Dept: FAMILY MEDICINE | Facility: CLINIC | Age: 38
End: 2023-08-01
Payer: COMMERCIAL

## 2023-08-01 VITALS
DIASTOLIC BLOOD PRESSURE: 80 MMHG | RESPIRATION RATE: 18 BRPM | HEART RATE: 78 BPM | OXYGEN SATURATION: 98 % | HEIGHT: 66 IN | BODY MASS INDEX: 28.64 KG/M2 | WEIGHT: 178.19 LBS | SYSTOLIC BLOOD PRESSURE: 120 MMHG

## 2023-08-01 DIAGNOSIS — Z98.84 BARIATRIC SURGERY STATUS: ICD-10-CM

## 2023-08-01 DIAGNOSIS — M25.561 ACUTE PAIN OF RIGHT KNEE: Primary | ICD-10-CM

## 2023-08-01 DIAGNOSIS — I10 ESSENTIAL HYPERTENSION: ICD-10-CM

## 2023-08-01 PROCEDURE — 3008F BODY MASS INDEX DOCD: CPT | Mod: S$GLB,,, | Performed by: STUDENT IN AN ORGANIZED HEALTH CARE EDUCATION/TRAINING PROGRAM

## 2023-08-01 PROCEDURE — 1160F RVW MEDS BY RX/DR IN RCRD: CPT | Mod: S$GLB,,, | Performed by: STUDENT IN AN ORGANIZED HEALTH CARE EDUCATION/TRAINING PROGRAM

## 2023-08-01 PROCEDURE — 3044F PR MOST RECENT HEMOGLOBIN A1C LEVEL <7.0%: ICD-10-PCS | Mod: S$GLB,,, | Performed by: STUDENT IN AN ORGANIZED HEALTH CARE EDUCATION/TRAINING PROGRAM

## 2023-08-01 PROCEDURE — 3008F PR BODY MASS INDEX (BMI) DOCUMENTED: ICD-10-PCS | Mod: S$GLB,,, | Performed by: STUDENT IN AN ORGANIZED HEALTH CARE EDUCATION/TRAINING PROGRAM

## 2023-08-01 PROCEDURE — 99999 PR PBB SHADOW E&M-EST. PATIENT-LVL IV: CPT | Mod: PBBFAC,,, | Performed by: STUDENT IN AN ORGANIZED HEALTH CARE EDUCATION/TRAINING PROGRAM

## 2023-08-01 PROCEDURE — 1159F PR MEDICATION LIST DOCUMENTED IN MEDICAL RECORD: ICD-10-PCS | Mod: S$GLB,,, | Performed by: STUDENT IN AN ORGANIZED HEALTH CARE EDUCATION/TRAINING PROGRAM

## 2023-08-01 PROCEDURE — 99214 PR OFFICE/OUTPT VISIT, EST, LEVL IV, 30-39 MIN: ICD-10-PCS | Mod: S$GLB,,, | Performed by: STUDENT IN AN ORGANIZED HEALTH CARE EDUCATION/TRAINING PROGRAM

## 2023-08-01 PROCEDURE — 3079F DIAST BP 80-89 MM HG: CPT | Mod: S$GLB,,, | Performed by: STUDENT IN AN ORGANIZED HEALTH CARE EDUCATION/TRAINING PROGRAM

## 2023-08-01 PROCEDURE — 99999 PR PBB SHADOW E&M-EST. PATIENT-LVL IV: ICD-10-PCS | Mod: PBBFAC,,, | Performed by: STUDENT IN AN ORGANIZED HEALTH CARE EDUCATION/TRAINING PROGRAM

## 2023-08-01 PROCEDURE — 99214 OFFICE O/P EST MOD 30 MIN: CPT | Mod: S$GLB,,, | Performed by: STUDENT IN AN ORGANIZED HEALTH CARE EDUCATION/TRAINING PROGRAM

## 2023-08-01 PROCEDURE — 3079F PR MOST RECENT DIASTOLIC BLOOD PRESSURE 80-89 MM HG: ICD-10-PCS | Mod: S$GLB,,, | Performed by: STUDENT IN AN ORGANIZED HEALTH CARE EDUCATION/TRAINING PROGRAM

## 2023-08-01 PROCEDURE — 1159F MED LIST DOCD IN RCRD: CPT | Mod: S$GLB,,, | Performed by: STUDENT IN AN ORGANIZED HEALTH CARE EDUCATION/TRAINING PROGRAM

## 2023-08-01 PROCEDURE — 3074F PR MOST RECENT SYSTOLIC BLOOD PRESSURE < 130 MM HG: ICD-10-PCS | Mod: S$GLB,,, | Performed by: STUDENT IN AN ORGANIZED HEALTH CARE EDUCATION/TRAINING PROGRAM

## 2023-08-01 PROCEDURE — 3044F HG A1C LEVEL LT 7.0%: CPT | Mod: S$GLB,,, | Performed by: STUDENT IN AN ORGANIZED HEALTH CARE EDUCATION/TRAINING PROGRAM

## 2023-08-01 PROCEDURE — 1160F PR REVIEW ALL MEDS BY PRESCRIBER/CLIN PHARMACIST DOCUMENTED: ICD-10-PCS | Mod: S$GLB,,, | Performed by: STUDENT IN AN ORGANIZED HEALTH CARE EDUCATION/TRAINING PROGRAM

## 2023-08-01 PROCEDURE — 3074F SYST BP LT 130 MM HG: CPT | Mod: S$GLB,,, | Performed by: STUDENT IN AN ORGANIZED HEALTH CARE EDUCATION/TRAINING PROGRAM

## 2023-08-01 RX ORDER — DICLOFENAC SODIUM 10 MG/G
2 GEL TOPICAL 4 TIMES DAILY
Qty: 450 G | Refills: 0 | Status: SHIPPED | OUTPATIENT
Start: 2023-08-01 | End: 2023-09-06

## 2023-08-01 NOTE — PROGRESS NOTES
Subjective:       Patient ID: Jenna Bueno is a 38 y.o. female.    Chief Complaint: Knee Pain (Inflammation and pain since June in right knee)    A few months of right knee pain and swelling  Started after a time at the gym.  Felt sore afterward. No obvious trauma or known injury.  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 Throbbing.  No giving way no weakness.    She has had bariatric surgery and is not allowed to take nsaids.       Review of Systems   Constitutional:  Negative for activity change and appetite change.   Respiratory:  Negative for shortness of breath.    Cardiovascular:  Negative for chest pain.   Gastrointestinal:  Negative for abdominal pain.   Genitourinary:  Negative for dysuria.   Musculoskeletal:  Positive for arthralgias.   Integumentary:  Negative for rash.   Psychiatric/Behavioral:  Negative for sleep disturbance.          Objective:      Physical Exam  Constitutional:       General: She is not in acute distress.     Appearance: Normal appearance. She is not ill-appearing.   Eyes:      Conjunctiva/sclera: Conjunctivae normal.   Cardiovascular:      Rate and Rhythm: Normal rate and regular rhythm.      Heart sounds: Normal heart sounds. No murmur heard.  Pulmonary:      Effort: Pulmonary effort is normal. No respiratory distress.      Breath sounds: Normal breath sounds.   Abdominal:      Palpations: Abdomen is soft.   Musculoskeletal:      Right knee: Swelling present. Normal range of motion. No tenderness. No LCL laxity or MCL laxity.      Instability Tests: Anterior drawer test negative. Posterior drawer test negative.      Right lower leg: No edema.      Left lower leg: No edema.   Skin:     General: Skin is warm and dry.   Neurological:      Mental Status: She is alert. Mental status is at baseline.      Gait: Gait normal.   Psychiatric:         Mood and Affect:  Mood normal.         Behavior: Behavior normal.         Assessment:       1. Acute pain of right knee    2. Essential hypertension    3. Bariatric surgery status        Plan:       Problem List Items Addressed This Visit          Cardiac/Vascular    Essential hypertension     Currently well controlled  Stable. Continue current medications and regular followup.              Endocrine    Bariatric surgery status     She cannot take nsaids.            Other Visit Diagnoses       Acute pain of right knee    -  Primary    voltaren gel as she cannot take oral nsaids. xr, pt, consider injection with ortho since she has limited options for anti-inflammatory    Relevant Medications    diclofenac sodium (VOLTAREN ARTHRITIS PAIN) 1 % Gel    Other Relevant Orders    X-Ray Knee 3 View Right    Ambulatory referral/consult to Physical/Occupational Therapy    Ambulatory referral/consult to Orthopedics

## 2023-08-02 ENCOUNTER — HOSPITAL ENCOUNTER (OUTPATIENT)
Dept: RADIOLOGY | Facility: HOSPITAL | Age: 38
Discharge: HOME | End: 2023-08-02
Attending: STUDENT IN AN ORGANIZED HEALTH CARE EDUCATION/TRAINING PROGRAM
Payer: COMMERCIAL

## 2023-08-02 DIAGNOSIS — M25.561 ACUTE PAIN OF RIGHT KNEE: ICD-10-CM

## 2023-08-02 PROCEDURE — 73562 XR KNEE 3 VIEW RIGHT: ICD-10-PCS | Mod: 26,RT,, | Performed by: RADIOLOGY

## 2023-08-02 PROCEDURE — 73562 X-RAY EXAM OF KNEE 3: CPT | Mod: 26,RT,, | Performed by: RADIOLOGY

## 2023-08-02 PROCEDURE — 73562 X-RAY EXAM OF KNEE 3: CPT | Mod: TC,RT

## 2023-08-08 ENCOUNTER — TELEPHONE (OUTPATIENT)
Dept: FAMILY MEDICINE | Facility: CLINIC | Age: 38
End: 2023-08-08
Payer: COMMERCIAL

## 2023-08-09 ENCOUNTER — TELEPHONE (OUTPATIENT)
Dept: FAMILY MEDICINE | Facility: CLINIC | Age: 38
End: 2023-08-09
Payer: COMMERCIAL

## 2023-09-05 ENCOUNTER — OFFICE VISIT (OUTPATIENT)
Dept: URGENT CARE | Facility: CLINIC | Age: 38
End: 2023-09-05
Payer: COMMERCIAL

## 2023-09-05 VITALS
HEART RATE: 100 BPM | DIASTOLIC BLOOD PRESSURE: 78 MMHG | HEIGHT: 66 IN | SYSTOLIC BLOOD PRESSURE: 122 MMHG | WEIGHT: 172 LBS | BODY MASS INDEX: 27.64 KG/M2 | OXYGEN SATURATION: 98 % | TEMPERATURE: 99 F

## 2023-09-05 DIAGNOSIS — L03.90 CELLULITIS, UNSPECIFIED CELLULITIS SITE: Primary | ICD-10-CM

## 2023-09-05 PROCEDURE — 99203 OFFICE O/P NEW LOW 30 MIN: CPT | Mod: S$GLB,,, | Performed by: NURSE PRACTITIONER

## 2023-09-05 PROCEDURE — 99203 PR OFFICE/OUTPT VISIT, NEW, LEVL III, 30-44 MIN: ICD-10-PCS | Mod: S$GLB,,, | Performed by: NURSE PRACTITIONER

## 2023-09-05 RX ORDER — METRONIDAZOLE 500 MG/1
500 TABLET ORAL 2 TIMES DAILY
COMMUNITY
Start: 2023-08-26

## 2023-09-05 RX ORDER — DOXYCYCLINE 100 MG/1
100 CAPSULE ORAL EVERY 12 HOURS
Qty: 14 CAPSULE | Refills: 0 | Status: SHIPPED | OUTPATIENT
Start: 2023-09-05 | End: 2023-09-12

## 2023-09-06 NOTE — PROGRESS NOTES
"Subjective:       Patient ID: Jenna Bueno is a 38 y.o. female.    Vitals:  height is 5' 6" (1.676 m) and weight is 78 kg (172 lb). Her oral temperature is 98.9 °F (37.2 °C). Her blood pressure is 122/78 and her pulse is 100. Her oxygen saturation is 98%.     Chief Complaint: Sore (Sore on lt ankle.  Shoe she was wearing on Sunday rubbed a blister.     Lt ankle swollen.   )    This is a 38 y.o. female who presents today with a chief complaint of sore on her left ankle.   Had a blister from shoes she was wearing.  Her lt ankle is now swollen.    Patient presents with:  Sore: Sore on lt ankle.  Shoe she was wearing on Sunday rubbed a blister.     Lt ankle swollen.               Skin:  Positive for erythema.           Objective:      Physical Exam   Constitutional: She is oriented to person, place, and time. obesity  HENT:   Head: Normocephalic and atraumatic.   Eyes: Conjunctivae are normal. Extraocular movement intact   Cardiovascular: Normal rate, regular rhythm, normal heart sounds and normal pulses.   Pulmonary/Chest: Effort normal and breath sounds normal.   Abdominal: Normal appearance.   Neurological: She is alert and oriented to person, place, and time.   Skin: erythema         Comments: Small abrasion posterior left ankle. Mild erythema/swelling to aforementioned area.   Psychiatric: Her behavior is normal.   Vitals reviewed.        Past medical history and current medications reviewed.       Assessment:           1. Cellulitis, unspecified cellulitis site              Plan:         Cellulitis, unspecified cellulitis site  -     doxycycline (VIBRAMYCIN) 100 MG Cap; Take 1 capsule (100 mg total) by mouth every 12 (twelve) hours. for 7 days  Dispense: 14 capsule; Refill: 0             There are no Patient Instructions on file for this visit.                           "

## 2023-11-09 ENCOUNTER — OFFICE VISIT (OUTPATIENT)
Dept: FAMILY MEDICINE | Facility: CLINIC | Age: 38
End: 2023-11-09
Payer: COMMERCIAL

## 2023-11-09 VITALS
HEIGHT: 66 IN | RESPIRATION RATE: 20 BRPM | OXYGEN SATURATION: 98 % | DIASTOLIC BLOOD PRESSURE: 70 MMHG | BODY MASS INDEX: 28.77 KG/M2 | TEMPERATURE: 98 F | HEART RATE: 70 BPM | WEIGHT: 179 LBS | SYSTOLIC BLOOD PRESSURE: 110 MMHG

## 2023-11-09 DIAGNOSIS — J02.8 PHARYNGITIS DUE TO OTHER ORGANISM: Primary | ICD-10-CM

## 2023-11-09 DIAGNOSIS — R05.1 ACUTE COUGH: ICD-10-CM

## 2023-11-09 DIAGNOSIS — R09.81 SINUS CONGESTION: ICD-10-CM

## 2023-11-09 LAB
CTP QC/QA: YES
POC MOLECULAR INFLUENZA A AGN: NEGATIVE
POC MOLECULAR INFLUENZA B AGN: NEGATIVE
S PYO RRNA THROAT QL PROBE: NEGATIVE
SARS-COV-2 RDRP RESP QL NAA+PROBE: NEGATIVE

## 2023-11-09 PROCEDURE — 87635: ICD-10-PCS | Mod: QW,S$GLB,, | Performed by: FAMILY MEDICINE

## 2023-11-09 PROCEDURE — 99214 PR OFFICE/OUTPT VISIT, EST, LEVL IV, 30-39 MIN: ICD-10-PCS | Mod: S$GLB,,, | Performed by: FAMILY MEDICINE

## 2023-11-09 PROCEDURE — 3074F PR MOST RECENT SYSTOLIC BLOOD PRESSURE < 130 MM HG: ICD-10-PCS | Mod: S$GLB,,, | Performed by: FAMILY MEDICINE

## 2023-11-09 PROCEDURE — 99214 OFFICE O/P EST MOD 30 MIN: CPT | Mod: S$GLB,,, | Performed by: FAMILY MEDICINE

## 2023-11-09 PROCEDURE — 3008F BODY MASS INDEX DOCD: CPT | Mod: S$GLB,,, | Performed by: FAMILY MEDICINE

## 2023-11-09 PROCEDURE — 87880 POCT RAPID STREP A: ICD-10-PCS | Mod: QW,S$GLB,, | Performed by: FAMILY MEDICINE

## 2023-11-09 PROCEDURE — 87635 SARS-COV-2 COVID-19 AMP PRB: CPT | Mod: QW,S$GLB,, | Performed by: FAMILY MEDICINE

## 2023-11-09 PROCEDURE — 3078F PR MOST RECENT DIASTOLIC BLOOD PRESSURE < 80 MM HG: ICD-10-PCS | Mod: S$GLB,,, | Performed by: FAMILY MEDICINE

## 2023-11-09 PROCEDURE — 87880 STREP A ASSAY W/OPTIC: CPT | Mod: QW,S$GLB,, | Performed by: FAMILY MEDICINE

## 2023-11-09 PROCEDURE — 3008F PR BODY MASS INDEX (BMI) DOCUMENTED: ICD-10-PCS | Mod: S$GLB,,, | Performed by: FAMILY MEDICINE

## 2023-11-09 PROCEDURE — 87502 INFLUENZA DNA AMP PROBE: CPT | Mod: QW,S$GLB,, | Performed by: FAMILY MEDICINE

## 2023-11-09 PROCEDURE — 87502 POCT INFLUENZA A/B MOLECULAR: ICD-10-PCS | Mod: QW,S$GLB,, | Performed by: FAMILY MEDICINE

## 2023-11-09 PROCEDURE — 3078F DIAST BP <80 MM HG: CPT | Mod: S$GLB,,, | Performed by: FAMILY MEDICINE

## 2023-11-09 PROCEDURE — 99999 PR PBB SHADOW E&M-EST. PATIENT-LVL III: CPT | Mod: PBBFAC,,, | Performed by: FAMILY MEDICINE

## 2023-11-09 PROCEDURE — 3044F PR MOST RECENT HEMOGLOBIN A1C LEVEL <7.0%: ICD-10-PCS | Mod: S$GLB,,, | Performed by: FAMILY MEDICINE

## 2023-11-09 PROCEDURE — 1159F PR MEDICATION LIST DOCUMENTED IN MEDICAL RECORD: ICD-10-PCS | Mod: S$GLB,,, | Performed by: FAMILY MEDICINE

## 2023-11-09 PROCEDURE — 1159F MED LIST DOCD IN RCRD: CPT | Mod: S$GLB,,, | Performed by: FAMILY MEDICINE

## 2023-11-09 PROCEDURE — 99999 PR PBB SHADOW E&M-EST. PATIENT-LVL III: ICD-10-PCS | Mod: PBBFAC,,, | Performed by: FAMILY MEDICINE

## 2023-11-09 PROCEDURE — 3044F HG A1C LEVEL LT 7.0%: CPT | Mod: S$GLB,,, | Performed by: FAMILY MEDICINE

## 2023-11-09 PROCEDURE — 3074F SYST BP LT 130 MM HG: CPT | Mod: S$GLB,,, | Performed by: FAMILY MEDICINE

## 2023-11-09 RX ORDER — AMOXICILLIN AND CLAVULANATE POTASSIUM 875; 125 MG/1; MG/1
1 TABLET, FILM COATED ORAL 2 TIMES DAILY
Qty: 20 TABLET | Refills: 0 | Status: SHIPPED | OUTPATIENT
Start: 2023-11-09

## 2023-11-09 RX ORDER — PREDNISONE 20 MG/1
20 TABLET ORAL DAILY
Qty: 7 TABLET | Refills: 0 | Status: SHIPPED | OUTPATIENT
Start: 2023-11-09

## 2023-11-09 NOTE — PROGRESS NOTES
Subjective:       Patient ID: Jenna Bueno is a 38 y.o. female.    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  She has had a tonsillectomy, which he may have strep pharyngitis instead of strep tonsillitis, or any other bacterial or viral agent causing her present symptoms.    Sore Throat   Associated symptoms include coughing, ear pain and headaches.   Sinus Problem  Associated symptoms include coughing, ear pain, headaches and a sore throat.   Headache   Associated symptoms include coughing, ear pain and a sore throat.     Review of Systems   HENT:  Positive for ear pain, postnasal drip and sore throat.    Respiratory:  Positive for cough.    Neurological:  Positive for headaches.       Patient Active Problem List   Diagnosis    Essential hypertension    Urethral diverticulum    Encounter for management of intrauterine contraceptive device (IUD)    Metrorrhagia    Bariatric surgery status       Objective:      Physical Exam  Vitals and nursing note reviewed.   Constitutional:       Appearance: Normal appearance. She is well-developed.   HENT:      Right Ear: Ear canal normal.      Left Ear: Ear canal normal.      Ears:      Comments: Right tympanic membrane dull and full.  Bilateral canals are normal left tympanic membrane also normal     Nose: Mucosal edema, congestion and rhinorrhea present.      Right Sinus: No maxillary sinus tenderness or frontal sinus tenderness.      Left Sinus: No maxillary sinus tenderness or frontal sinus tenderness.      Mouth/Throat:      Pharynx: Posterior oropharyngeal erythema present. No oropharyngeal exudate.      Tonsils: No tonsillar abscesses.      Comments: Posterior oropharynx and soft palate both erythematous.  Tonsils absent  Cardiovascular:      Rate and Rhythm: Normal rate and regular  "rhythm.      Heart sounds: Normal heart sounds.   Pulmonary:      Effort: Pulmonary effort is normal.      Breath sounds: Normal breath sounds.   Skin:     General: Skin is warm and dry.   Neurological:      General: No focal deficit present.      Mental Status: She is alert and oriented to person, place, and time. Mental status is at baseline.   Psychiatric:         Mood and Affect: Mood normal.         Behavior: Behavior normal.         Thought Content: Thought content normal.         Lab Results   Component Value Date    WBC 6.2 04/19/2023    HGB 13.4 04/19/2023    HCT 40.3 04/19/2023     04/19/2023    CHOL 186 03/30/2022    TRIG 68 03/30/2022    HDL 53 03/30/2022    ALT 15 04/19/2023    AST 15 04/19/2023     04/19/2023    K 4.5 04/19/2023     04/19/2023    CREATININE 0.64 04/19/2023    BUN 12 04/19/2023    CO2 25 04/19/2023    TSH 1.850 04/19/2023    INR 1.0 04/19/2023    HGBA1C 5.1 04/19/2023     The ASCVD Risk score (Hanny DK, et al., 2019) failed to calculate for the following reasons:    The 2019 ASCVD risk score is only valid for ages 40 to 79  Visit Vitals  /70 (BP Location: Left arm, Patient Position: Sitting, BP Method: Medium (Manual))   Pulse 70   Temp 98.3 °F (36.8 °C) (Oral)   Resp 20   Ht 5' 6" (1.676 m)   Wt 81.2 kg (179 lb)   SpO2 98%   BMI 28.89 kg/m²      Assessment:       1. Pharyngitis due to other organism    2. Acute cough    3. Sinus congestion        Plan:       1. Pharyngitis due to other organism  -     POCT COVID-19 Rapid Screening  -     POCT Influenza A/B Molecular  -     POCT rapid strep A    2. Acute cough  -     POCT COVID-19 Rapid Screening  -     POCT Influenza A/B Molecular  -     POCT rapid strep A    3. Sinus congestion  -     POCT COVID-19 Rapid Screening  -     POCT Influenza A/B Molecular  -     POCT rapid strep A       Follow up if symptoms worsen or fail to improve.      Future Appointments       Date Provider Specialty Appt Notes    2/1/2024 " Kal Mckeno, PT Outpatient Rehab Acute pain of right knee

## 2023-11-10 ENCOUNTER — PATIENT MESSAGE (OUTPATIENT)
Dept: FAMILY MEDICINE | Facility: CLINIC | Age: 38
End: 2023-11-10
Payer: COMMERCIAL

## 2024-09-29 ENCOUNTER — OFFICE VISIT (OUTPATIENT)
Dept: URGENT CARE | Facility: CLINIC | Age: 39
End: 2024-09-29
Payer: COMMERCIAL

## 2024-09-29 VITALS
OXYGEN SATURATION: 99 % | HEIGHT: 66 IN | HEART RATE: 84 BPM | BODY MASS INDEX: 28.77 KG/M2 | DIASTOLIC BLOOD PRESSURE: 82 MMHG | TEMPERATURE: 98 F | RESPIRATION RATE: 17 BRPM | WEIGHT: 179 LBS | SYSTOLIC BLOOD PRESSURE: 117 MMHG

## 2024-09-29 DIAGNOSIS — R10.9 LEFT SIDED ABDOMINAL PAIN: Primary | ICD-10-CM

## 2024-09-29 DIAGNOSIS — R07.81 RIB PAIN ON LEFT SIDE: ICD-10-CM

## 2024-09-29 LAB
BILIRUBIN, UA POC OHS: NEGATIVE
BLOOD, UA POC OHS: ABNORMAL
CLARITY, UA POC OHS: CLEAR
COLOR, UA POC OHS: YELLOW
GLUCOSE, UA POC OHS: NEGATIVE
KETONES, UA POC OHS: NEGATIVE
LEUKOCYTES, UA POC OHS: NEGATIVE
NITRITE, UA POC OHS: NEGATIVE
PH, UA POC OHS: 7
PROTEIN, UA POC OHS: NEGATIVE
SPECIFIC GRAVITY, UA POC OHS: 1.01
UROBILINOGEN, UA POC OHS: 0.2

## 2024-09-29 PROCEDURE — 99215 OFFICE O/P EST HI 40 MIN: CPT | Mod: S$GLB,,, | Performed by: NURSE PRACTITIONER

## 2024-09-29 PROCEDURE — 81003 URINALYSIS AUTO W/O SCOPE: CPT | Mod: QW,S$GLB,, | Performed by: NURSE PRACTITIONER

## 2024-09-29 NOTE — PROGRESS NOTES
"Subjective:      Patient ID: Jenna Bueno is a 39 y.o. female.    Vitals:  height is 5' 6" (1.676 m) and weight is 81.2 kg (179 lb). Her oral temperature is 98 °F (36.7 °C). Her blood pressure is 117/82 and her pulse is 84. Her respiration is 17 and oxygen saturation is 99%.     Chief Complaint: Pain (Symptoms started on 2 days ago. Symptoms are the following: pain on inspiration on her left side. Symptoms not treated)    39 y.o. female who presents today with a chief complaint left upper abdomen and left lower rib pain x 2 days. She reports pain is worse with inspiration. She denies any other symptoms or complaints.    Pain  This is a new problem. The current episode started in the past 7 days. The problem occurs intermittently. The problem has been unchanged. Associated symptoms include abdominal pain. Exacerbated by: breathing. She has tried nothing for the symptoms. The treatment provided no relief.       Cardiovascular:         Left anterior lower rib pain.   Gastrointestinal:  Positive for abdominal pain.      Objective:     Physical Exam   Constitutional: She is oriented to person, place, and time.  Non-toxic appearance. She does not appear ill. No distress. obesity  HENT:   Head: Normocephalic and atraumatic.   Nose: Nose normal.   Mouth/Throat: Mucous membranes are moist. Oropharynx is clear.   Eyes: Conjunctivae are normal. Extraocular movement intact   Neck: Neck supple.   Cardiovascular: Normal rate, regular rhythm, normal heart sounds and normal pulses.   Pulmonary/Chest: Effort normal and breath sounds normal.   Abdominal: Normal appearance and bowel sounds are normal. Soft. flat abdomen There is abdominal tenderness.      Comments: + TTP left upper quadrant.   Musculoskeletal: Normal range of motion.         General: Normal range of motion.   Lymphadenopathy:     She has no cervical adenopathy.   Neurological: She is alert and oriented to person, place, and time.   Skin: Skin is warm, dry, not " diaphoretic and no rash.   Psychiatric: Her behavior is normal.   Vitals reviewed.    Results for orders placed or performed in visit on 09/29/24   POCT Urinalysis(Instrument)    Collection Time: 09/29/24  1:06 PM   Result Value Ref Range    Color, POC UA Yellow Yellow, Straw, Colorless    Clarity, POC UA Clear Clear    Glucose, POC UA Negative Negative    Bilirubin, POC UA Negative Negative    Ketones, POC UA Negative Negative    Spec Grav POC UA 1.015 1.005 - 1.030    Blood, POC UA Trace-intact (A) Negative    pH, POC UA 7.0 5.0 - 8.0    Protein, POC UA Negative Negative    Urobilinogen, POC UA 0.2 <=1.0    Nitrite, POC UA Negative Negative    WBC, POC UA Negative Negative    XR CHEST PA AND LATERAL    Result Date: 9/29/2024  EXAMINATION: XR CHEST PA AND LATERAL CLINICAL HISTORY: Pleurodynia TECHNIQUE: PA and lateral views of the chest were performed. COMPARISON: None FINDINGS: The lungs are clear, with normal appearance of pulmonary vasculature and no pleural effusion or pneumothorax. The cardiac silhouette is normal in size. The hilar and mediastinal contours are unremarkable. Bones are intact.     No acute abnormality. Electronically signed by: Patricia Obregon MD Date:    09/29/2024 Time:    13:35       Assessment:     1. Left sided abdominal pain    2. Rib pain on left side        Plan:       Left sided abdominal pain  -     POCT Urinalysis(Instrument)    Rib pain on left side  -     XR CHEST PA AND LATERAL; Future; Expected date: 09/29/2024      INSTRUCTIONS  To ER for further evaluation of abdominal pain.

## 2024-09-30 ENCOUNTER — HOSPITAL ENCOUNTER (EMERGENCY)
Facility: HOSPITAL | Age: 39
Discharge: HOME | End: 2024-09-30
Payer: COMMERCIAL

## 2024-09-30 VITALS
BODY MASS INDEX: 28.77 KG/M2 | TEMPERATURE: 99 F | RESPIRATION RATE: 18 BRPM | DIASTOLIC BLOOD PRESSURE: 77 MMHG | WEIGHT: 179 LBS | HEIGHT: 66 IN | OXYGEN SATURATION: 98 % | HEART RATE: 71 BPM | SYSTOLIC BLOOD PRESSURE: 124 MMHG

## 2024-09-30 DIAGNOSIS — R10.12 LEFT UPPER QUADRANT ABDOMINAL PAIN: Primary | ICD-10-CM

## 2024-09-30 LAB
ALBUMIN SERPL BCP-MCNC: 3.8 G/DL (ref 3.5–5.2)
ALP SERPL-CCNC: 47 U/L (ref 55–135)
ALT SERPL W/O P-5'-P-CCNC: 7 U/L (ref 10–44)
ANION GAP SERPL CALC-SCNC: 12 MMOL/L (ref 8–16)
AST SERPL-CCNC: 11 U/L (ref 10–40)
B-HCG UR QL: NEGATIVE
BASOPHILS # BLD AUTO: 0.04 K/UL (ref 0–0.2)
BASOPHILS NFR BLD: 0.6 % (ref 0–1.9)
BILIRUB SERPL-MCNC: 0.3 MG/DL (ref 0.1–1)
BILIRUB UR QL STRIP: NEGATIVE
BUN SERPL-MCNC: 10 MG/DL (ref 6–20)
CALCIUM SERPL-MCNC: 9.5 MG/DL (ref 8.7–10.5)
CHLORIDE SERPL-SCNC: 107 MMOL/L (ref 95–110)
CLARITY UR: CLEAR
CO2 SERPL-SCNC: 25 MMOL/L (ref 23–29)
COLOR UR: YELLOW
CREAT SERPL-MCNC: 0.7 MG/DL (ref 0.5–1.4)
DIFFERENTIAL METHOD BLD: NORMAL
EOSINOPHIL # BLD AUTO: 0.1 K/UL (ref 0–0.5)
EOSINOPHIL NFR BLD: 1.6 % (ref 0–8)
ERYTHROCYTE [DISTWIDTH] IN BLOOD BY AUTOMATED COUNT: 11.9 % (ref 11.5–14.5)
EST. GFR  (NO RACE VARIABLE): >60 ML/MIN/1.73 M^2
GLUCOSE SERPL-MCNC: 95 MG/DL (ref 70–110)
GLUCOSE UR QL STRIP: NEGATIVE
HCT VFR BLD AUTO: 37.2 % (ref 37–48.5)
HGB BLD-MCNC: 12.4 G/DL (ref 12–16)
HGB UR QL STRIP: NEGATIVE
IMM GRANULOCYTES # BLD AUTO: 0.02 K/UL (ref 0–0.04)
IMM GRANULOCYTES NFR BLD AUTO: 0.3 % (ref 0–0.5)
KETONES UR QL STRIP: NEGATIVE
LEUKOCYTE ESTERASE UR QL STRIP: NEGATIVE
LIPASE SERPL-CCNC: 25 U/L (ref 4–60)
LYMPHOCYTES # BLD AUTO: 1.8 K/UL (ref 1–4.8)
LYMPHOCYTES NFR BLD: 27.4 % (ref 18–48)
MCH RBC QN AUTO: 28.8 PG (ref 27–31)
MCHC RBC AUTO-ENTMCNC: 33.3 G/DL (ref 32–36)
MCV RBC AUTO: 86 FL (ref 82–98)
MONOCYTES # BLD AUTO: 0.4 K/UL (ref 0.3–1)
MONOCYTES NFR BLD: 6 % (ref 4–15)
NEUTROPHILS # BLD AUTO: 4.3 K/UL (ref 1.8–7.7)
NEUTROPHILS NFR BLD: 64.1 % (ref 38–73)
NITRITE UR QL STRIP: NEGATIVE
NRBC BLD-RTO: 0 /100 WBC
PH UR STRIP: 6 [PH] (ref 5–8)
PLATELET # BLD AUTO: 275 K/UL (ref 150–450)
PMV BLD AUTO: 10.1 FL (ref 9.2–12.9)
POTASSIUM SERPL-SCNC: 4.1 MMOL/L (ref 3.5–5.1)
PROT SERPL-MCNC: 7 G/DL (ref 6–8.4)
PROT UR QL STRIP: NEGATIVE
RBC # BLD AUTO: 4.31 M/UL (ref 4–5.4)
SODIUM SERPL-SCNC: 144 MMOL/L (ref 136–145)
SP GR UR STRIP: 1.01 (ref 1–1.03)
URN SPEC COLLECT METH UR: NORMAL
UROBILINOGEN UR STRIP-ACNC: 1 EU/DL
WBC # BLD AUTO: 6.68 K/UL (ref 3.9–12.7)

## 2024-09-30 PROCEDURE — 74177 CT ABD & PELVIS W/CONTRAST: CPT | Mod: TC

## 2024-09-30 PROCEDURE — 85025 COMPLETE CBC W/AUTO DIFF WBC: CPT | Performed by: NURSE PRACTITIONER

## 2024-09-30 PROCEDURE — 25500020 PHARM REV CODE 255: Performed by: NURSE PRACTITIONER

## 2024-09-30 PROCEDURE — 99285 EMERGENCY DEPT VISIT HI MDM: CPT | Mod: 25

## 2024-09-30 PROCEDURE — 83690 ASSAY OF LIPASE: CPT | Performed by: NURSE PRACTITIONER

## 2024-09-30 PROCEDURE — 87389 HIV-1 AG W/HIV-1&-2 AB AG IA: CPT | Performed by: EMERGENCY MEDICINE

## 2024-09-30 PROCEDURE — 81025 URINE PREGNANCY TEST: CPT | Performed by: NURSE PRACTITIONER

## 2024-09-30 PROCEDURE — 81003 URINALYSIS AUTO W/O SCOPE: CPT | Performed by: NURSE PRACTITIONER

## 2024-09-30 PROCEDURE — 80053 COMPREHEN METABOLIC PANEL: CPT | Performed by: NURSE PRACTITIONER

## 2024-09-30 PROCEDURE — 86803 HEPATITIS C AB TEST: CPT | Performed by: EMERGENCY MEDICINE

## 2024-09-30 PROCEDURE — 74177 CT ABD & PELVIS W/CONTRAST: CPT | Mod: 26,,, | Performed by: RADIOLOGY

## 2024-09-30 RX ORDER — ONDANSETRON 4 MG/1
4 TABLET, FILM COATED ORAL EVERY 6 HOURS
Qty: 12 TABLET | Refills: 0 | Status: SHIPPED | OUTPATIENT
Start: 2024-09-30

## 2024-09-30 RX ADMIN — IOHEXOL 75 ML: 350 INJECTION, SOLUTION INTRAVENOUS at 12:09

## 2024-09-30 NOTE — ED TRIAGE NOTES
Patient presents to ED POV with c/o LUQ and LLQ abdominal pain 3 days. She denies N/V/D. She was seen at urgent care yesterday and had a negative chest x-ray. She is AAOx4. Skin warm, dry to touch. Respirations even, non labored. Ambulatory unassisted with steady gait into triage.

## 2024-09-30 NOTE — DISCHARGE INSTRUCTIONS
Instructed to drink clear liquids today small amounts frequently then progress diet slowly. Avoid greasy, fatty or spicy foods. Follow up PCP and ER for any acute worsening of symptoms

## 2024-09-30 NOTE — ED PROVIDER NOTES
Encounter Date: 9/30/2024       History     Chief Complaint   Patient presents with    Abdominal Pain     39-year-old female with complaints of left upper quadrant pain that radiates into her chest for the past 3 days.  She states pain is worse with a deep breath.  Pain is currently at 3/10.  She states that she went to the urgent care yesterday and had a chest x-ray that was normal.  She denies fever, nausea, vomiting, diarrhea or constipation.  She denies cough or congestion        Review of patient's allergies indicates:  No Known Allergies  Past Medical History:   Diagnosis Date    Hypertension 2007    prior to gastric sleeve     Past Surgical History:   Procedure Laterality Date    COSMETIC SURGERY  06/13/2023    skin removal    D & C, PERITUBAL CYST REMOVAL  04/27/2011    gastric sleeve      TONSILLECTOMY, ADENOIDECTOMY  1995     Family History   Problem Relation Name Age of Onset    Hypertension Mother      Heart disease Father      Diabetes Father      Lymphoma Maternal Grandfather      Lymphoma Maternal Uncle       Social History     Tobacco Use    Smoking status: Never     Passive exposure: Never    Smokeless tobacco: Never   Substance Use Topics    Alcohol use: Yes     Comment: occasionally    Drug use: Never     Review of Systems   Constitutional:  Negative for fever.   HENT:  Negative for sore throat.    Respiratory:  Negative for shortness of breath.    Cardiovascular:  Negative for chest pain.   Gastrointestinal:  Positive for abdominal pain. Negative for nausea.   Genitourinary:  Negative for dysuria.   Musculoskeletal:  Negative for back pain.   Skin:  Negative for rash.   Neurological:  Negative for weakness.   Hematological:  Does not bruise/bleed easily.       Physical Exam     Initial Vitals [09/30/24 1009]   BP Pulse Resp Temp SpO2   124/77 71 18 99.3 °F (37.4 °C) 98 %      MAP       --         Physical Exam    Nursing note and vitals reviewed.  Constitutional: She appears well-developed and  well-nourished.   HENT:   Head: Normocephalic and atraumatic.   Eyes: Conjunctivae and EOM are normal. Pupils are equal, round, and reactive to light.   Neck: Neck supple.   Normal range of motion.  Cardiovascular:  Normal rate and regular rhythm.           Pulmonary/Chest: Breath sounds normal. No respiratory distress. She has no wheezes.   Abdominal: Abdomen is soft. Bowel sounds are normal. There is no abdominal tenderness. There is no rebound and no guarding.   Musculoskeletal:         General: No tenderness. Normal range of motion.      Cervical back: Normal range of motion and neck supple.     Neurological: She is alert and oriented to person, place, and time. She has normal strength. No cranial nerve deficit.   Skin: Skin is warm and dry.   Psychiatric: She has a normal mood and affect.         ED Course   Procedures  Labs Reviewed   COMPREHENSIVE METABOLIC PANEL - Abnormal       Result Value    Sodium 144      Potassium 4.1      Chloride 107      CO2 25      Glucose 95      BUN 10      Creatinine 0.7      Calcium 9.5      Total Protein 7.0      Albumin 3.8      Total Bilirubin 0.3      Alkaline Phosphatase 47 (*)     AST 11      ALT 7 (*)     eGFR >60.0      Anion Gap 12      Narrative:     Release to patient->Immediate   CBC W/ AUTO DIFFERENTIAL    WBC 6.68      RBC 4.31      Hemoglobin 12.4      Hematocrit 37.2      MCV 86      MCH 28.8      MCHC 33.3      RDW 11.9      Platelets 275      MPV 10.1      Immature Granulocytes 0.3      Gran # (ANC) 4.3      Immature Grans (Abs) 0.02      Lymph # 1.8      Mono # 0.4      Eos # 0.1      Baso # 0.04      nRBC 0      Gran % 64.1      Lymph % 27.4      Mono % 6.0      Eosinophil % 1.6      Basophil % 0.6      Differential Method Automated      Narrative:     Release to patient->Immediate   LIPASE    Lipase 25      Narrative:     Release to patient->Immediate   URINALYSIS, REFLEX TO URINE CULTURE    Specimen UA Urine, Unspecified      Color, UA Yellow       Appearance, UA Clear      pH, UA 6.0      Specific Gravity, UA 1.010      Protein, UA Negative      Glucose, UA Negative      Ketones, UA Negative      Bilirubin (UA) Negative      Occult Blood UA Negative      Nitrite, UA Negative      Urobilinogen, UA 1.0      Leukocytes, UA Negative      Narrative:     Preferred Collection Type->Urine, Clean Catch  Specimen Source->Urine   PREGNANCY TEST, URINE RAPID    Preg Test, Ur Negative      Narrative:     Specimen Source->Urine   HIV 1 / 2 ANTIBODY   HEPATITIS C ANTIBODY          Imaging Results              CT Abdomen Pelvis With IV Contrast NO Oral Contrast (Final result)  Result time 09/30/24 12:48:21      Final result by Patrick Marie MD (09/30/24 12:48:21)                   Impression:      1. Small hiatal hernia.  2. Prior gastric surgery.  3. Hepatomegaly.  4. Fluid distended but nondilated loops of small bowel and colon which can be seen with a enterocolitis.  5. IUD.      Electronically signed by: Patrick Marie  Date:    09/30/2024  Time:    12:48               Narrative:    EXAMINATION:  CT ABDOMEN PELVIS WITH IV CONTRAST    CLINICAL HISTORY:  Abdominal pain, acute, nonlocalized;    TECHNIQUE:  Low dose axial images, sagittal and coronal reformations were obtained from the lung bases to the pubic symphysis following the IV administration of 75 mL of Omnipaque 350 .  Oral contrast was not given.    COMPARISON:  None.    FINDINGS:  There is a small hiatal hernia.  Prior gastric surgery.  The liver is mildly enlarged but normal in attenuation.  Spleen is normal in size and attenuation.  Gallbladder, pancreas and adrenal glands are unremarkable.    Kidneys are normal in size and enhance homogeneously.  No renal calculi.  No changes of hydronephrosis.  No perinephric inflammatory change.  Small subcentimeter cyst.    The mid to distal loops of small bowel are fluid distended but nondilated.  There is fluid distending the proximal colon.  This can be seen with a  enterocolitis.  No CT evidence for bowel obstruction.  Terminal ileum is normal in appearance.  The appendix is not definitely identified.  No pericecal inflammatory change to suggest acute appendicitis.    The bladder is partially distended.  Uterus and ovaries are normal in size and attenuation.  There is an IUD in place.  Small amount of free fluid in cul-de-sac.    No significant mesenteric or retroperitoneal lymphadenopathy.  Postsurgical change from prior ventral hernia repair.                                       Medications   iohexoL (OMNIPAQUE 350) injection 75 mL (75 mLs Intravenous Given 9/30/24 1244)     Medical Decision Making  39-year-old female with complaints of left upper quadrant pain that radiates into her chest for the past 3 days.  She states pain is worse with a deep breath.  Pain is currently at 3/10.  She states that she went to the urgent care yesterday and had a chest x-ray that was normal.  She denies fever, nausea, vomiting, diarrhea or constipation.  She denies cough or congestion.    Lab work is normal.  CT of the abdomen and pelvis shows some dilated loops of bowel consistent with possible enterocolitis.  Shows no bowel obstruction.  Discussed findings with Dr. Diamond and with the patient.  She denies any diarrhea or constipation.  She is nontender on physical exam.  Instructed to rest her bowel by drinking clear liquids today small amounts frequently then slowly progress diet with avoiding greasy, spicy or fatty foods for the next several days.  Instructed to return to ED for any acute worsening of symptoms and otherwise follow up with Gastroenterology.  Referral made to family practice to establish primary care.    She had a Aide-en-Y surgery in 2020 in [place]      Amount and/or Complexity of Data Reviewed  Labs: ordered.  Radiology: ordered.    Risk  Prescription drug management.      Additional MDM:   Differential Diagnosis:   Musculoskeletal pain, acute abdominal pain, UTI                                     Clinical Impression:  Final diagnoses:  [R10.12] Left upper quadrant abdominal pain (Primary)          ED Disposition Condition    Discharge Stable          ED Prescriptions       Medication Sig Dispense Start Date End Date Auth. Provider    ondansetron (ZOFRAN) 4 MG tablet Take 1 tablet (4 mg total) by mouth every 6 (six) hours. 12 tablet 9/30/2024 -- Kaylah Edmonds NP          Follow-up Information    None          Kaylah Edmonds NP  09/30/24 6313

## 2024-09-30 NOTE — Clinical Note
"Jenna Yostanda" Myles was seen and treated in our emergency department on 9/30/2024.  She may return to work on 10/03/2024.       If you have any questions or concerns, please don't hesitate to call.      Kaylah Edmonds, SONA"

## 2024-10-01 LAB
HCV AB SERPL QL IA: NORMAL
HIV 1+2 AB+HIV1 P24 AG SERPL QL IA: NORMAL

## 2024-10-01 NOTE — PROGRESS NOTES
Subjective:       Patient ID: Jenna Bueno is a 39 y.o. female Body mass index is 29.39 kg/m².    Chief Complaint: Abdominal Pain    This patient is new to me.  Referring Provider: Kaylah Edmonds for L Sided abdominal pain.     Last Monday started with Left sided pain - hurts to push on lower left.   Notices bloating - usually on left side most of the time - happens even with liquid diet    No diarrhea but does have hx of amanda-en-y    ER Visit for Abdominal Pain  Abdominal Pain     39-year-old female with complaints of left upper quadrant pain that radiates into her chest for the past 3 days.  She states pain is worse with a deep breath.  Pain is currently at 3/10.  She states that she went to the urgent care yesterday and had a chest x-ray that was normal.  She denies fever, nausea, vomiting, diarrhea or constipation.  She denies cough or congestion    Medical Decision Making  39-year-old female with complaints of left upper quadrant pain that radiates into her chest for the past 3 days.  She states pain is worse with a deep breath.  Pain is currently at 3/10.  She states that she went to the urgent care yesterday and had a chest x-ray that was normal.  She denies fever, nausea, vomiting, diarrhea or constipation.  She denies cough or congestion.     Lab work is normal.  CT of the abdomen and pelvis shows some dilated loops of bowel consistent with possible enterocolitis.  Shows no bowel obstruction.  Discussed findings with Dr. Diamond and with the patient.  She denies any diarrhea or constipation.  She is nontender on physical exam.  Instructed to rest her bowel by drinking clear liquids today small amounts frequently then slowly progress diet with avoiding greasy, spicy or fatty foods for the next several days.  Instructed to return to ED for any acute worsening of symptoms and otherwise follow up with Gastroenterology.  Referral made to family practice to establish primary care.     She had a Amanda-en-Y  surgery in 2020 in [place]      9/30/24 CT A/P  FINDINGS:  There is a small hiatal hernia.  Prior gastric surgery.  The liver is mildly enlarged but normal in attenuation.  Spleen is normal in size and attenuation.  Gallbladder, pancreas and adrenal glands are unremarkable.     Kidneys are normal in size and enhance homogeneously.  No renal calculi.  No changes of hydronephrosis.  No perinephric inflammatory change.  Small subcentimeter cyst.     The mid to distal loops of small bowel are fluid distended but nondilated.  There is fluid distending the proximal colon.  This can be seen with a enterocolitis.  No CT evidence for bowel obstruction.  Terminal ileum is normal in appearance.  The appendix is not definitely identified.  No pericecal inflammatory change to suggest acute appendicitis.     The bladder is partially distended.  Uterus and ovaries are normal in size and attenuation.  There is an IUD in place.  Small amount of free fluid in cul-de-sac.     No significant mesenteric or retroperitoneal lymphadenopathy.  Postsurgical change from prior ventral hernia repair.     Impression:     1. Small hiatal hernia.  2. Prior gastric surgery.  3. Hepatomegaly.  4. Fluid distended but nondilated loops of small bowel and colon which can be seen with a enterocolitis.  5. IUD.        Review of Systems   Constitutional:  Positive for activity change and appetite change. Negative for fatigue, fever and unexpected weight change.   HENT:  Negative for sore throat and trouble swallowing.    Respiratory:  Negative for cough and shortness of breath.    Cardiovascular:  Negative for chest pain.   Gastrointestinal:  Positive for abdominal distention and abdominal pain. Negative for anal bleeding, blood in stool, constipation, diarrhea, nausea, rectal pain and vomiting.       No LMP recorded. (Menstrual status: Birth Control).  Past Medical History:   Diagnosis Date    Hypertension 2007    prior to gastric sleeve     Past Surgical  History:   Procedure Laterality Date    COSMETIC SURGERY  06/13/2023    skin removal    D & C, PERITUBAL CYST REMOVAL  04/27/2011    gastric sleeve      TONSILLECTOMY, ADENOIDECTOMY  1995     Family History   Problem Relation Name Age of Onset    Hypertension Mother      Heart disease Father      Diabetes Father      Lymphoma Maternal Grandfather      Lymphoma Maternal Uncle       Social History     Tobacco Use    Smoking status: Never     Passive exposure: Never    Smokeless tobacco: Never   Substance Use Topics    Alcohol use: Yes     Comment: occasionally    Drug use: Never     Wt Readings from Last 10 Encounters:   10/09/24 82.6 kg (182 lb 1.6 oz)   09/30/24 81.2 kg (179 lb)   09/29/24 81.2 kg (179 lb)   04/30/24 85.3 kg (188 lb)   11/09/23 81.2 kg (179 lb)   09/06/23 79.8 kg (176 lb)   09/05/23 78 kg (172 lb)   08/01/23 80.8 kg (178 lb 3.2 oz)   04/19/23 81.2 kg (179 lb)   02/20/23 82.1 kg (181 lb)     Lab Results   Component Value Date    WBC 6.68 09/30/2024    HGB 12.4 09/30/2024    HCT 37.2 09/30/2024    MCV 86 09/30/2024     09/30/2024     CMP  Sodium   Date Value Ref Range Status   09/30/2024 144 136 - 145 mmol/L Final     Potassium   Date Value Ref Range Status   09/30/2024 4.1 3.5 - 5.1 mmol/L Final     Chloride   Date Value Ref Range Status   09/30/2024 107 95 - 110 mmol/L Final     CO2   Date Value Ref Range Status   09/30/2024 25 23 - 29 mmol/L Final     Glucose   Date Value Ref Range Status   09/30/2024 95 70 - 110 mg/dL Final     BUN   Date Value Ref Range Status   09/30/2024 10 6 - 20 mg/dL Final     Creatinine   Date Value Ref Range Status   09/30/2024 0.7 0.5 - 1.4 mg/dL Final     Calcium   Date Value Ref Range Status   09/30/2024 9.5 8.7 - 10.5 mg/dL Final     Total Protein   Date Value Ref Range Status   09/30/2024 7.0 6.0 - 8.4 g/dL Final     Albumin   Date Value Ref Range Status   09/30/2024 3.8 3.5 - 5.2 g/dL Final     Total Bilirubin   Date Value Ref Range Status   09/30/2024 0.3 0.1  "- 1.0 mg/dL Final     Comment:     For infants and newborns, interpretation of results should be based  on gestational age, weight and in agreement with clinical  observations.    Premature Infant recommended reference ranges:  Up to 24 hours.............<8.0 mg/dL  Up to 48 hours............<12.0 mg/dL  3-5 days..................<15.0 mg/dL  6-29 days.................<15.0 mg/dL       Alkaline Phosphatase   Date Value Ref Range Status   09/30/2024 47 (L) 55 - 135 U/L Final     AST   Date Value Ref Range Status   09/30/2024 11 10 - 40 U/L Final     ALT   Date Value Ref Range Status   09/30/2024 7 (L) 10 - 44 U/L Final     Anion Gap   Date Value Ref Range Status   09/30/2024 12 8 - 16 mmol/L Final     eGFR if    Date Value Ref Range Status   03/30/2022 >60.0 >60 mL/min/1.73 m^2 Final     eGFR if non    Date Value Ref Range Status   03/30/2022 >60.0 >60 mL/min/1.73 m^2 Final     Comment:     Calculation used to obtain the estimated glomerular filtration  rate (eGFR) is the CKD-EPI equation.        No results found for: "AMYLASE"  Lab Results   Component Value Date    LIPASE 25 09/30/2024     No results found for: "LIPASERES"  Lab Results   Component Value Date    TSH 1.850 04/19/2023       Reviewed prior medical records including radiology report of CT A/P 10/2024 & endoscopy history (see surgical history).    Objective:      Physical Exam  Vitals and nursing note reviewed.   Constitutional:       General: She is not in acute distress.     Appearance: She is not ill-appearing.   HENT:      Head: Normocephalic and atraumatic.      Mouth/Throat:      Mouth: Mucous membranes are moist.      Pharynx: Oropharynx is clear.   Eyes:      Conjunctiva/sclera: Conjunctivae normal.   Cardiovascular:      Rate and Rhythm: Normal rate.      Pulses: Normal pulses.   Pulmonary:      Effort: Pulmonary effort is normal. No respiratory distress.   Abdominal:      General: Abdomen is flat. Bowel sounds are " normal. There is distension.      Palpations: Abdomen is soft.      Tenderness: There is abdominal tenderness.   Skin:     General: Skin is warm and dry.      Capillary Refill: Capillary refill takes 2 to 3 seconds.   Neurological:      Mental Status: She is alert and oriented to person, place, and time.         Assessment:       1. Left lower quadrant abdominal pain    2. Left upper quadrant abdominal pain    3. Enterocolitis    4. Abdominal bloating        Plan:       Left lower quadrant abdominal pain & Left upper quadrant abdominal pain, Abdominal bloating  - schedule Colonoscopy, discussed procedure with the patient, including risks and benefits, patient verbalized understanding  Rule out sucrase deficiency  Rule out H. Pylori     Suspect possibly due to adhesions/scar tissue from cosmetic surgery  Follow up if symptoms worsen or fail to improve  -     H. pylori antigen, stool; Future; Expected date: 10/09/2024  -     Ambulatory referral/consult to Gastroenterology  -     Case Request Endoscopy: COLONOSCOPY    Enterocolitis  - noted on CT A/P - no symptoms except pain and bloating  - schedule Colonoscopy, discussed procedure with the patient, including risks and benefits, patient verbalized understanding    Follow up if symptoms worsen or fail to improve.      If no improvement in symptoms or symptoms worsen, call/follow-up at clinic or go to ER.       SANJEEV Gaines, MARCKPRohanC    Encounter includes face to face time and non-face to face time preparing to see the patient (eg, review of tests), obtaining and/or reviewing separately obtained history, documenting clinical information in the electronic or other health record, independently interpreting results (not separately reported) and communicating results to the patient/family/caregiver, or care coordination (not separately reported).     A dictation software program was used for this note. Please expect some simple typographical errors in this note.

## 2024-10-09 ENCOUNTER — OFFICE VISIT (OUTPATIENT)
Dept: GASTROENTEROLOGY | Facility: CLINIC | Age: 39
End: 2024-10-09
Payer: COMMERCIAL

## 2024-10-09 VITALS
HEIGHT: 66 IN | BODY MASS INDEX: 29.27 KG/M2 | HEART RATE: 72 BPM | SYSTOLIC BLOOD PRESSURE: 112 MMHG | DIASTOLIC BLOOD PRESSURE: 69 MMHG | WEIGHT: 182.13 LBS

## 2024-10-09 DIAGNOSIS — R14.0 ABDOMINAL BLOATING: ICD-10-CM

## 2024-10-09 DIAGNOSIS — R10.32 LEFT LOWER QUADRANT ABDOMINAL PAIN: Primary | ICD-10-CM

## 2024-10-09 DIAGNOSIS — R10.12 LEFT UPPER QUADRANT ABDOMINAL PAIN: ICD-10-CM

## 2024-10-09 DIAGNOSIS — K52.9 ENTEROCOLITIS: ICD-10-CM

## 2024-10-09 PROCEDURE — 3078F DIAST BP <80 MM HG: CPT | Mod: CPTII,S$GLB,,

## 2024-10-09 PROCEDURE — 3008F BODY MASS INDEX DOCD: CPT | Mod: CPTII,S$GLB,,

## 2024-10-09 PROCEDURE — 99999 PR PBB SHADOW E&M-EST. PATIENT-LVL III: CPT | Mod: PBBFAC,,,

## 2024-10-09 PROCEDURE — 99204 OFFICE O/P NEW MOD 45 MIN: CPT | Mod: S$GLB,,,

## 2024-10-09 PROCEDURE — 3074F SYST BP LT 130 MM HG: CPT | Mod: CPTII,S$GLB,,

## 2024-10-17 ENCOUNTER — PATIENT MESSAGE (OUTPATIENT)
Dept: GASTROENTEROLOGY | Facility: CLINIC | Age: 39
End: 2024-10-17
Payer: COMMERCIAL

## 2024-11-08 ENCOUNTER — OFFICE VISIT (OUTPATIENT)
Dept: FAMILY MEDICINE | Facility: CLINIC | Age: 39
End: 2024-11-08
Payer: COMMERCIAL

## 2024-11-08 VITALS
HEIGHT: 66 IN | HEART RATE: 55 BPM | OXYGEN SATURATION: 100 % | BODY MASS INDEX: 29.38 KG/M2 | WEIGHT: 182.81 LBS | SYSTOLIC BLOOD PRESSURE: 122 MMHG | RESPIRATION RATE: 18 BRPM | DIASTOLIC BLOOD PRESSURE: 78 MMHG

## 2024-11-08 DIAGNOSIS — F41.1 GAD (GENERALIZED ANXIETY DISORDER): Primary | ICD-10-CM

## 2024-11-08 PROCEDURE — 99999 PR PBB SHADOW E&M-EST. PATIENT-LVL III: CPT | Mod: PBBFAC,,, | Performed by: FAMILY MEDICINE

## 2024-11-08 RX ORDER — FLUOXETINE 10 MG/1
10 CAPSULE ORAL DAILY
Qty: 30 CAPSULE | Refills: 3 | Status: SHIPPED | OUTPATIENT
Start: 2024-11-08 | End: 2025-11-08

## 2024-11-08 NOTE — PATIENT INSTRUCTIONS
[facility] ([place]) - [phone]  [facility] ([place]) - [phone]    Thank you for allowing me to participate in your care today. It is an honor to be a part of your healthcare team at [facility]. If you had labs ordered today, you will receive notification via [employer], phone call or mailed letter regarding your results within 7 days. If you have any questions or concerns regarding your visit today, please do not hesitate to contact us.  Sincerely,   Kathy Vyas M.D.

## 2024-11-08 NOTE — PROGRESS NOTES
Subjective:       Patient ID: Jenna Bueno is a 39 y.o. female.    Chief Complaint: Anxiety    Ms. Bueno presents today to talk about anxiety.   She is a regular patient of Dr. Fisher. Previously a patient of Dr. Kincaid    She has a son with autism and is now at a behavior school. She left her job because of the situation with him. She has an upcoming wedding. She has a lot going on and feels like she is not functioning as well. Her fiance pointed out that after an incident she seems to have several days to recover and will be in her head for several days. She used to go to the gym everyday but now she does not have the energy. She just doesn't feel like she can.     She has been told by family for some time that she should consider treating this. She reports that it got a lot worse last year when her son started having a lot of issues in school. This year she has a new job which is a better situation but overall more stressful.         .  Patient Active Problem List   Diagnosis    Essential hypertension    Urethral diverticulum    Encounter for management of intrauterine contraceptive device (IUD)    Metrorrhagia    Bariatric surgery status     Jenna has a current medication list which includes the following prescription(s): lactobacillus rhamnosus gg, multivitamin, fluoxetine, and ondansetron, and the following Facility-Administered Medications: levonorgestrel.    Review of Systems   Constitutional:  Negative for activity change, appetite change, fatigue and fever.   Respiratory:  Negative for shortness of breath.    Gastrointestinal:  Negative for abdominal pain.   Integumentary:  Negative for rash.         Health Maintenance Due   Topic Date Due    Pneumococcal Vaccines (Age 0-64) (1 of 2 - PCV) Never done    TETANUS VACCINE  Never done    Influenza Vaccine (1) Never done    COVID-19 Vaccine (1 - 2024-25 season) Never done      Health Maintenance reviewed- nothing additional needed today    Objective:      Physical Exam  Vitals and nursing note reviewed.   Constitutional:       General: She is not in acute distress.     Appearance: She is not ill-appearing.   Cardiovascular:      Rate and Rhythm: Normal rate and regular rhythm.      Heart sounds: No murmur heard.  Pulmonary:      Effort: Pulmonary effort is normal.      Breath sounds: Normal breath sounds. No wheezing.   Skin:     General: Skin is warm and dry.      Findings: No rash.   Neurological:      Mental Status: She is alert.   Psychiatric:         Mood and Affect: Mood normal.         Behavior: Behavior normal.         Assessment:       1. MYRIAM (generalized anxiety disorder)        Plan:       1. MYRIAM (generalized anxiety disorder)  -     FLUoxetine 10 MG capsule; Take 1 capsule (10 mg total) by mouth once daily.  Dispense: 30 capsule; Refill: 3                   Detail Level: Zone

## 2024-11-21 ENCOUNTER — TELEPHONE (OUTPATIENT)
Dept: GASTROENTEROLOGY | Facility: CLINIC | Age: 39
End: 2024-11-21
Payer: COMMERCIAL

## 2024-11-21 NOTE — TELEPHONE ENCOUNTER
----- Message from Jania sent at 11/21/2024  4:25 PM CST -----  Type:  Cancellation Request     Name of Caller:ADAMS BEARD [[id number]]  What is the date of the patients appointment?: 11/27  Reason for cancellation?:cost of procedure  Would the patient rather a call back or a response via [facility]? call  Best Call Back Number:[phone]   Additional Information: The patient would like to cancel their colonoscopy.

## 2024-12-06 ENCOUNTER — OFFICE VISIT (OUTPATIENT)
Dept: FAMILY MEDICINE | Facility: CLINIC | Age: 39
End: 2024-12-06
Payer: COMMERCIAL

## 2024-12-06 VITALS
HEIGHT: 66 IN | WEIGHT: 184.5 LBS | BODY MASS INDEX: 29.65 KG/M2 | RESPIRATION RATE: 16 BRPM | HEART RATE: 64 BPM | OXYGEN SATURATION: 99 % | DIASTOLIC BLOOD PRESSURE: 65 MMHG | SYSTOLIC BLOOD PRESSURE: 110 MMHG

## 2024-12-06 DIAGNOSIS — F41.1 GAD (GENERALIZED ANXIETY DISORDER): ICD-10-CM

## 2024-12-06 DIAGNOSIS — I10 ESSENTIAL HYPERTENSION: Primary | ICD-10-CM

## 2024-12-06 PROCEDURE — 99999 PR PBB SHADOW E&M-EST. PATIENT-LVL III: CPT | Mod: PBBFAC,,, | Performed by: STUDENT IN AN ORGANIZED HEALTH CARE EDUCATION/TRAINING PROGRAM

## 2024-12-06 NOTE — PROGRESS NOTES
"Subjective:       Patient ID: Jenna Bueno is a 39 y.o. female.    Chief Complaint: Med Change Follow Up (4 week follow up on starting Fluoxetine 10 mg)    History of Present Illness    CHIEF COMPLAINT:  Ms. Bueno presents today for follow-up on fluoxetine treatment.    ANXIETY AND MEDICATION MANAGEMENT:  She reports improvement in anxiety symptoms since starting fluoxetine. Previously, she experienced severe anxiety that significantly impacted her functionality, describing it as feeling like "the world was ending." Her fiancé has also noticed a marked improvement in her condition. While she still experiences some anxiety, she reports a substantial reduction in symptoms compared to before starting the medication.    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 She believes she has a good immune system, attributing this to her constant exposure at work. The most ill she has been was after the COVID-19 pandemic when mask-wearing was discontinued, suggesting a period of reduced exposure had temporarily affected her immunity.       Review of Systems   Constitutional:  Negative for activity change and appetite change.   Respiratory:  Negative for shortness of breath.    Cardiovascular:  Negative for chest pain.   Gastrointestinal:  Negative for abdominal pain.   Genitourinary:  Negative for dysuria.   Integumentary:  Negative for rash.   Psychiatric/Behavioral:  Negative for depressed mood and sleep disturbance. The patient is not nervous/anxious.       Patient Active Problem List   Diagnosis    Essential hypertension    Urethral diverticulum    Encounter for management of intrauterine contraceptive device (IUD)    Metrorrhagia    Bariatric surgery status    MYRIAM (generalized anxiety disorder)     Jenna has a current medication list which includes the following prescription(s): fluoxetine, lactobacillus rhamnosus gg, and multivitamin, and the " following Facility-Administered Medications: levonorgestrel.        Health Maintenance Due   Topic Date Due    Pneumococcal Vaccines (Age 0-64) (1 of 2 - PCV) Never done    TETANUS VACCINE  Never done    COVID-19 Vaccine (1 - 2024-25 season) Never done      Health Maintenance reviewed and discussed- encouraged vaccines.     Objective:      Physical Exam  Constitutional:       General: She is not in acute distress.     Appearance: Normal appearance. She is not ill-appearing.   Eyes:      Conjunctiva/sclera: Conjunctivae normal.   Cardiovascular:      Rate and Rhythm: Normal rate and regular rhythm.      Heart sounds: Normal heart sounds. No murmur heard.  Pulmonary:      Effort: Pulmonary effort is normal. No respiratory distress.      Breath sounds: Normal breath sounds. No wheezing or rales.   Musculoskeletal:      Right lower leg: No edema.      Left lower leg: No edema.   Skin:     General: Skin is warm and dry.   Neurological:      Mental Status: She is alert. Mental status is at baseline.      Gait: Gait normal.   Psychiatric:         Mood and Affect: Mood normal.         Behavior: Behavior normal.         Thought Content: Thought content normal.         Judgment: Judgment normal.         Assessment:       Assessment & Plan    1. Evaluated patient's response to fluoxetine for anxiety management  2. Considered maintaining current dose due to noticeable improvement in anxiety symptoms           Plan:       1. Essential hypertension  Assessment & Plan:  Stable. Continue current diet and regular followup.          2. MYRIAM (generalized anxiety disorder)  Assessment & Plan:  Doing much better on fluoxetine 10mg  Continue for now and then re address in a  few week for need for increase           This note was generated with the assistance of ambient listening technology. Verbal consent was obtained by the patient and accompanying visitor(s) for the recording of patient appointment to facilitate this note. I attest to  having reviewed and edited the generated note for accuracy, though some syntax or spelling errors may persist. Please contact the author of this note for any clarification.

## 2024-12-06 NOTE — ASSESSMENT & PLAN NOTE
Doing much better on fluoxetine 10mg  Continue for now and then re address in a  few week for need for increase   98.1

## 2025-03-12 ENCOUNTER — OFFICE VISIT (OUTPATIENT)
Dept: FAMILY MEDICINE | Facility: CLINIC | Age: 40
End: 2025-03-12
Payer: COMMERCIAL

## 2025-03-12 VITALS
BODY MASS INDEX: 30.76 KG/M2 | SYSTOLIC BLOOD PRESSURE: 115 MMHG | WEIGHT: 191.38 LBS | DIASTOLIC BLOOD PRESSURE: 65 MMHG | HEART RATE: 78 BPM | OXYGEN SATURATION: 98 % | RESPIRATION RATE: 16 BRPM | HEIGHT: 66 IN

## 2025-03-12 DIAGNOSIS — E66.811 CLASS 1 OBESITY WITHOUT SERIOUS COMORBIDITY WITH BODY MASS INDEX (BMI) OF 30.0 TO 30.9 IN ADULT, UNSPECIFIED OBESITY TYPE: ICD-10-CM

## 2025-03-12 DIAGNOSIS — H60.502 ACUTE OTITIS EXTERNA OF LEFT EAR, UNSPECIFIED TYPE: ICD-10-CM

## 2025-03-12 DIAGNOSIS — G44.209 TENSION HEADACHE: ICD-10-CM

## 2025-03-12 DIAGNOSIS — F41.1 GAD (GENERALIZED ANXIETY DISORDER): Primary | ICD-10-CM

## 2025-03-12 DIAGNOSIS — I10 ESSENTIAL HYPERTENSION: ICD-10-CM

## 2025-03-12 PROCEDURE — 3078F DIAST BP <80 MM HG: CPT | Mod: S$GLB,,, | Performed by: STUDENT IN AN ORGANIZED HEALTH CARE EDUCATION/TRAINING PROGRAM

## 2025-03-12 PROCEDURE — 99999 PR PBB SHADOW E&M-EST. PATIENT-LVL III: CPT | Mod: PBBFAC,,, | Performed by: STUDENT IN AN ORGANIZED HEALTH CARE EDUCATION/TRAINING PROGRAM

## 2025-03-12 PROCEDURE — 99214 OFFICE O/P EST MOD 30 MIN: CPT | Mod: S$GLB,,, | Performed by: STUDENT IN AN ORGANIZED HEALTH CARE EDUCATION/TRAINING PROGRAM

## 2025-03-12 PROCEDURE — 3074F SYST BP LT 130 MM HG: CPT | Mod: S$GLB,,, | Performed by: STUDENT IN AN ORGANIZED HEALTH CARE EDUCATION/TRAINING PROGRAM

## 2025-03-12 PROCEDURE — 1159F MED LIST DOCD IN RCRD: CPT | Mod: S$GLB,,, | Performed by: STUDENT IN AN ORGANIZED HEALTH CARE EDUCATION/TRAINING PROGRAM

## 2025-03-12 PROCEDURE — 3008F BODY MASS INDEX DOCD: CPT | Mod: S$GLB,,, | Performed by: STUDENT IN AN ORGANIZED HEALTH CARE EDUCATION/TRAINING PROGRAM

## 2025-03-12 RX ORDER — NEOMYCIN SULFATE, POLYMYXIN B SULFATE AND HYDROCORTISONE 10; 3.5; 1 MG/ML; MG/ML; [USP'U]/ML
3 SUSPENSION/ DROPS AURICULAR (OTIC) 3 TIMES DAILY
Qty: 10 ML | Refills: 0 | Status: SHIPPED | OUTPATIENT
Start: 2025-03-12

## 2025-03-12 RX ORDER — CYCLOBENZAPRINE HCL 10 MG
10 TABLET ORAL 3 TIMES DAILY PRN
Qty: 30 TABLET | Refills: 0 | Status: SHIPPED | OUTPATIENT
Start: 2025-03-12 | End: 2025-03-22

## 2025-03-12 RX ORDER — TIRZEPATIDE 2.5 MG/.5ML
2.5 INJECTION, SOLUTION SUBCUTANEOUS
Qty: 2 ML | Refills: 0 | Status: SHIPPED | OUTPATIENT
Start: 2025-03-12

## 2025-03-12 NOTE — ASSESSMENT & PLAN NOTE
Stable on diet changes  BP Readings from Last 3 Encounters:   03/12/25 115/65   12/06/24 110/65   11/08/24 122/78

## 2025-03-12 NOTE — ASSESSMENT & PLAN NOTE
Will try zepbound with titration up to max dose over a few months, depending on pt tolerance.  In conjunction with low calorie, low carb portion control diet and increased physical activity with exercise of moderate intensity 3-5 times a week.  Monthly weight check in. Goal is BMI decrease with ultimate goal of normal BMI.

## 2025-03-12 NOTE — PROGRESS NOTES
Subjective:       Patient ID: Jenna Bueno is a 39 y.o. female.    Chief Complaint: Follow-up    History of Present Illness    CHIEF COMPLAINT:  Ms. Bueno presents today for follow up of headaches    HISTORY OF PRESENT ILLNESS:  She reports bilateral temple headaches starting in the last couple months, occurring primarily in the evening. Associated symptoms include dizziness, nausea, watery eyes, and runny nose on the affected side. She denies vision changes. She notes shoulder pain with stress.    MEDICAL HISTORY:  She has a history of severe migraines during teenage years requiring medication, and hypertension. Multiple MRIs were performed as part of previous headache workup.    MEDICATIONS:  She reports good response to Fluoxetine.       Review of Systems   Constitutional:  Negative for activity change and appetite change.   Respiratory:  Negative for shortness of breath.    Cardiovascular:  Negative for chest pain.   Gastrointestinal:  Negative for abdominal pain.   Genitourinary:  Negative for dysuria.   Integumentary:  Negative for rash.   Neurological:  Positive for headaches.   Psychiatric/Behavioral:  Negative for sleep disturbance.       Problem List[1]  Jenna has a current medication list which includes the following prescription(s): fluoxetine, lactobacillus rhamnosus gg, multivitamin, cyclobenzaprine, neomycin-polymyxin-hydrocortisone, and zepbound, and the following Facility-Administered Medications: levonorgestrel.        Health Maintenance Due   Topic Date Due    TETANUS VACCINE  Never done    Pneumococcal Vaccines (Age 0-49) (1 of 2 - PCV) Never done    COVID-19 Vaccine (1 - 2024-25 season) Never done      Health Maintenance reviewed and discussed- encourage vaccines.     Objective:      Physical Exam  Constitutional:       General: She is not in acute distress.     Appearance: Normal appearance. She is not ill-appearing.   HENT:      Right Ear: Tympanic membrane, ear canal and  external ear normal. There is no impacted cerumen.      Left Ear: Tympanic membrane normal. There is no impacted cerumen.      Ears:      Comments: Erythematous ear canal  Eyes:      Conjunctiva/sclera: Conjunctivae normal.   Cardiovascular:      Rate and Rhythm: Normal rate and regular rhythm.      Heart sounds: Normal heart sounds. No murmur heard.  Pulmonary:      Effort: Pulmonary effort is normal. No respiratory distress.      Breath sounds: Normal breath sounds.   Abdominal:      Palpations: Abdomen is soft.   Musculoskeletal:      Right lower leg: No edema.      Left lower leg: No edema.   Skin:     General: Skin is warm and dry.   Neurological:      Mental Status: She is alert. Mental status is at baseline.      Gait: Gait normal.   Psychiatric:         Mood and Affect: Mood normal.         Behavior: Behavior normal.                 Assessment:       Assessment & Plan    1. Assessed headache symptoms as consistent with tension headache pattern  2. Ruled out migraine recurrence and temporal arteritis based on symptom presentation  3. Considered possibility of TMJ or muscle tension contributing to headaches  4. Evaluated for potential cluster headache symptoms  5. Noted potential stress-related factors (wedding planning, AC issues) as possible contributors to headaches  6. Observed redness in patient's ear, unclear if related to headache symptoms           Plan:       1. MYRIAM (generalized anxiety disorder)  Assessment & Plan:  Stable. Continue current medications and regular followup.        2. Essential hypertension  Assessment & Plan:  Stable on diet changes  BP Readings from Last 3 Encounters:   03/12/25 115/65   12/06/24 110/65   11/08/24 122/78           3. Tension headache  -     cyclobenzaprine (FLEXERIL) 10 MG tablet; Take 1 tablet (10 mg total) by mouth 3 (three) times daily as needed for Muscle spasms.  Dispense: 30 tablet; Refill: 0    4. Acute otitis externa of left ear, unspecified type  -      neomycin-polymyxin-hydrocortisone (CORTISPORIN) 3.5-10,000-1 mg/mL-unit/mL-% otic suspension; Place 3 drops into the left ear 3 (three) times daily.  Dispense: 10 mL; Refill: 0    5. Class 1 obesity without serious comorbidity with body mass index (BMI) of 30.0 to 30.9 in adult, unspecified obesity type  Assessment & Plan:  Will try zepbound with titration up to max dose over a few months, depending on pt tolerance.  In conjunction with low calorie, low carb portion control diet and increased physical activity with exercise of moderate intensity 3-5 times a week.  Monthly weight check in. Goal is BMI decrease with ultimate goal of normal BMI.      Orders:  -     ZEPBOUND 2.5 mg/0.5 mL PnIj; Inject 2.5 mg into the skin every 7 days.  Dispense: 2 mL; Refill: 0         This note was generated with the assistance of ambient listening technology. Verbal consent was obtained by the patient and accompanying visitor(s) for the recording of patient appointment to facilitate this note. I attest to having reviewed and edited the generated note for accuracy, though some syntax or spelling errors may persist. Please contact the author of this note for any clarification.                    [1]   Patient Active Problem List  Diagnosis    Essential hypertension    Urethral diverticulum    Encounter for management of intrauterine contraceptive device (IUD)    Metrorrhagia    Bariatric surgery status    MYRIAM (generalized anxiety disorder)    Class 1 obesity without serious comorbidity with body mass index (BMI) of 30.0 to 30.9 in adult

## 2025-03-18 DIAGNOSIS — F41.1 GAD (GENERALIZED ANXIETY DISORDER): ICD-10-CM

## 2025-03-18 RX ORDER — FLUOXETINE 10 MG/1
10 CAPSULE ORAL
Qty: 30 CAPSULE | Refills: 1 | Status: SHIPPED | OUTPATIENT
Start: 2025-03-18

## 2025-03-18 NOTE — TELEPHONE ENCOUNTER
Care Due:                  Date            Visit Type   Department     Provider  --------------------------------------------------------------------------------                                EP -                              PRIMARY      [facility] FAMILY  Last Visit: 03-      CARE ([facility])   MEDICINE       Anastasiia Bella                              EP -                              PRIMARY      [facility] FAM[facility]  Next Visit: 04-      CARE ([facility])   MEDICINE       Anastasiia Bella                                                            Last  Test          Frequency    Reason                     Performed    Due Date  --------------------------------------------------------------------------------    HBA1C.......  6 months...  ZEPBOUND.................  04-   10-    Health [facility] Embedded Care Due Messages. Reference number: [id number].   3/18/2025 6:12:50 AM CDT

## 2025-03-18 NOTE — TELEPHONE ENCOUNTER
Refill Routing Note   Medication(s) are not appropriate for processing by [facility] for the following reason(s):        No active prescription written by provider    [facility] action(s):  Defer     Requires labs : Yes      Medication Therapy Plan: FOVS      Appointments  past 12m or future 3m with PCP    Date Provider   Last Visit   3/12/2025 Anastasiia Bella MD   Next Visit   4/11/2025 Anastasiia Bella MD   ED visits in past 90 days: 0        Note composed:10:42 AM 03/18/2025

## 2025-04-11 ENCOUNTER — OFFICE VISIT (OUTPATIENT)
Dept: FAMILY MEDICINE | Facility: CLINIC | Age: 40
End: 2025-04-11
Payer: COMMERCIAL

## 2025-04-11 VITALS
HEIGHT: 66 IN | OXYGEN SATURATION: 98 % | DIASTOLIC BLOOD PRESSURE: 72 MMHG | SYSTOLIC BLOOD PRESSURE: 115 MMHG | HEART RATE: 81 BPM | RESPIRATION RATE: 16 BRPM | WEIGHT: 193.38 LBS | BODY MASS INDEX: 31.08 KG/M2

## 2025-04-11 DIAGNOSIS — M25.512 CHRONIC LEFT SHOULDER PAIN: ICD-10-CM

## 2025-04-11 DIAGNOSIS — G89.29 CHRONIC LEFT SHOULDER PAIN: ICD-10-CM

## 2025-04-11 DIAGNOSIS — E66.09 CLASS 1 OBESITY DUE TO EXCESS CALORIES WITHOUT SERIOUS COMORBIDITY WITH BODY MASS INDEX (BMI) OF 30.0 TO 30.9 IN ADULT: Primary | ICD-10-CM

## 2025-04-11 DIAGNOSIS — E66.811 CLASS 1 OBESITY DUE TO EXCESS CALORIES WITHOUT SERIOUS COMORBIDITY WITH BODY MASS INDEX (BMI) OF 30.0 TO 30.9 IN ADULT: Primary | ICD-10-CM

## 2025-04-11 PROCEDURE — 99999 PR PBB SHADOW E&M-EST. PATIENT-LVL IV: CPT | Mod: PBBFAC,,, | Performed by: STUDENT IN AN ORGANIZED HEALTH CARE EDUCATION/TRAINING PROGRAM

## 2025-04-11 NOTE — ASSESSMENT & PLAN NOTE
Encourage bariatric medicine team visit.  She has taken phentermine in the past.  She cannot seem to get the zepbound with her insurance

## 2025-04-11 NOTE — PROGRESS NOTES
Subjective:       Patient ID: Jenna Bueno is a 39 y.o. female.    Chief Complaint: Follow-up    History of Present Illness    HPI:  Ms. Bueno presents with left shoulder pain and concerns about weight management. Ms. Bueno reports left shoulder pain for an extended period, exacerbated recently due to increased gym activity. She describes a grinding sensation with popping and clicking noises. The shoulder is not consistently bothersome but has been aching and causing more discomfort with lifting heavier weights at the gym.    Regarding weight management, she expresses frustration about medication coverage. She underwent gastric sleeve surgery a few years ago. She reports increased hunger and more frequent eating, while maintaining a healthy diet and increasing her gym attendance. She is concerned about potential weight gain, particularly in light of her upcoming wedding and subsequent plans for pregnancy.    MEDICATIONS:  Ms. Bueno is currently on ZepBound. She has taken Phentermine in the past but discontinued it years ago.    MEDICAL HISTORY:  Ms. Bueno has been pregnant once (G1) and has one delivery (P1). She denies the possibility of current pregnancy.    FAMILY HISTORY:  Family history is significant for the patient's son with autism.    SURGICAL HISTORY:  Ms. Bueno underwent a gastric sleeve procedure a few years ago.       Review of Systems   Constitutional:  Negative for activity change and appetite change.   Respiratory:  Negative for shortness of breath.    Cardiovascular:  Negative for chest pain.   Gastrointestinal:  Negative for abdominal pain.   Genitourinary:  Negative for dysuria.   Integumentary:  Negative for rash.   Psychiatric/Behavioral:  Negative for depressed mood and sleep disturbance. The patient is not nervous/anxious.       Problem List[1]  Jenna has a current medication list which includes the following prescription(s): fluoxetine, lactobacillus  rhamnosus gg, multivitamin, neomycin-polymyxin-hydrocortisone, and zepbound, and the following Facility-Administered Medications: levonorgestrel.        Health Maintenance Due   Topic Date Due    TETANUS VACCINE  Never done    Pneumococcal Vaccines (Age 0-49) (1 of 2 - PCV) Never done    COVID-19 Vaccine (1 - 2024-25 season) Never done      Health Maintenance reviewed and discussed- encourage vaccines.     Objective:      Physical Exam  Constitutional:       General: She is not in acute distress.     Appearance: Normal appearance. She is not ill-appearing.   Eyes:      Conjunctiva/sclera: Conjunctivae normal.   Cardiovascular:      Rate and Rhythm: Normal rate and regular rhythm.      Heart sounds: Normal heart sounds. No murmur heard.  Pulmonary:      Effort: Pulmonary effort is normal. No respiratory distress.      Breath sounds: Normal breath sounds. No wheezing or rales.   Musculoskeletal:      Right lower leg: No edema.      Left lower leg: No edema.   Lymphadenopathy:      Cervical: No cervical adenopathy.   Skin:     General: Skin is warm and dry.   Neurological:      Mental Status: She is alert. Mental status is at baseline.      Gait: Gait normal.   Psychiatric:         Mood and Affect: Mood normal.         Behavior: Behavior normal.         Thought Content: Thought content normal.         Judgment: Judgment normal.             Assessment:       Assessment & Plan    1. Evaluated weight loss medication options, noting limited availability and insurance coverage challenges, including OTC and prescription choices.  2. Evaluated shoulder pain, suspecting possible arthritis, tendonitis, or muscle spasm causing shoulder popping and clicking.           Plan:       1. Class 1 obesity due to excess calories without serious comorbidity with body mass index (BMI) of 30.0 to 30.9 in adult  Assessment & Plan:  Encourage bariatric medicine team visit.  She has taken phentermine in the past.  She cannot seem to get the  zepbound with her insurance    Orders:  -     Ambulatory referral/consult to Bariatric/Obesity Medicine; Future; Expected date: 04/18/2025    2. Chronic left shoulder pain  Comments:  continue prn nsaids  Orders:  -     Ambulatory referral/consult to Orthopedics; Future; Expected date: 04/18/2025  -     X-Ray Shoulder 2 or More Views Left; Future; Expected date: 04/11/2025         This note was generated with the assistance of ambient listening technology. Verbal consent was obtained by the patient and accompanying visitor(s) for the recording of patient appointment to facilitate this note. I attest to having reviewed and edited the generated note for accuracy, though some syntax or spelling errors may persist. Please contact the author of this note for any clarification.                    [1]   Patient Active Problem List  Diagnosis    Essential hypertension    Urethral diverticulum    Encounter for management of intrauterine contraceptive device (IUD)    Metrorrhagia    Bariatric surgery status    MYRIAM (generalized anxiety disorder)    Class 1 obesity without serious comorbidity with body mass index (BMI) of 30.0 to 30.9 in adult

## 2025-04-18 ENCOUNTER — HOSPITAL ENCOUNTER (OUTPATIENT)
Dept: RADIOLOGY | Facility: HOSPITAL | Age: 40
Discharge: HOME | End: 2025-04-18
Attending: STUDENT IN AN ORGANIZED HEALTH CARE EDUCATION/TRAINING PROGRAM
Payer: COMMERCIAL

## 2025-04-18 DIAGNOSIS — G89.29 CHRONIC LEFT SHOULDER PAIN: ICD-10-CM

## 2025-04-18 DIAGNOSIS — M25.512 CHRONIC LEFT SHOULDER PAIN: ICD-10-CM

## 2025-04-18 PROCEDURE — 73030 X-RAY EXAM OF SHOULDER: CPT | Mod: 26,LT,, | Performed by: RADIOLOGY

## 2025-04-18 PROCEDURE — 73030 X-RAY EXAM OF SHOULDER: CPT | Mod: TC,LT

## 2025-04-19 ENCOUNTER — RESULTS FOLLOW-UP (OUTPATIENT)
Dept: FAMILY MEDICINE | Facility: CLINIC | Age: 40
End: 2025-04-19

## 2025-05-26 ENCOUNTER — OFFICE VISIT (OUTPATIENT)
Dept: ORTHOPEDICS | Facility: CLINIC | Age: 40
End: 2025-05-26
Payer: COMMERCIAL

## 2025-05-26 VITALS — RESPIRATION RATE: 16 BRPM

## 2025-05-26 DIAGNOSIS — M25.512 LEFT SHOULDER PAIN, UNSPECIFIED CHRONICITY: Primary | ICD-10-CM

## 2025-05-26 DIAGNOSIS — M75.100 ROTATOR CUFF SYNDROME, UNSPECIFIED LATERALITY: ICD-10-CM

## 2025-05-26 DIAGNOSIS — M89.8X1 PAIN OF LEFT SCAPULA: Primary | ICD-10-CM

## 2025-05-26 PROCEDURE — 1160F RVW MEDS BY RX/DR IN RCRD: CPT | Mod: CPTII,S$GLB,, | Performed by: ORTHOPAEDIC SURGERY

## 2025-05-26 PROCEDURE — 99204 OFFICE O/P NEW MOD 45 MIN: CPT | Mod: S$GLB,,, | Performed by: ORTHOPAEDIC SURGERY

## 2025-05-26 PROCEDURE — 99999 PR PBB SHADOW E&M-EST. PATIENT-LVL III: CPT | Mod: PBBFAC,,, | Performed by: ORTHOPAEDIC SURGERY

## 2025-05-26 PROCEDURE — 1159F MED LIST DOCD IN RCRD: CPT | Mod: CPTII,S$GLB,, | Performed by: ORTHOPAEDIC SURGERY

## 2025-05-26 NOTE — PROGRESS NOTES
Past Medical History:   Diagnosis Date    Hypertension 2007    prior to gastric sleeve       Past Surgical History:   Procedure Laterality Date    BREAST SURGERY  07/2023    Augmentation    COSMETIC SURGERY  06/13/2023    skin removal    D & C, PERITUBAL CYST REMOVAL  04/27/2011    gastric sleeve      TONSILLECTOMY, ADENOIDECTOMY  1995       Current Outpatient Medications   Medication Sig    FLUoxetine 10 MG capsule Take 1 capsule by mouth once daily    Lactobacillus rhamnosus GG (CULTURELLE) 10 billion cell capsule Take 1 capsule by mouth once daily.    multivitamin (THERAGRAN) per tablet Take 1 tablet by mouth once daily.    neomycin-polymyxin-hydrocortisone (CORTISPORIN) 3.5-10,000-1 mg/mL-unit/mL-% otic suspension Place 3 drops into the left ear 3 (three) times daily. (Patient not taking: Reported on 4/11/2025)    ZEPBOUND 2.5 mg/0.5 mL PnIj Inject 2.5 mg into the skin every 7 days. (Patient not taking: Reported on 4/11/2025)     Current Facility-Administered Medications   Medication    levonorgestreL (LILETTA) 20.4 mcg/24 hrs (8 yrs) 52 mg IUD 1 Intra Uterine Device       Review of patient's allergies indicates:  No Known Allergies    Family History   Problem Relation Name Age of Onset    Hypertension Mother Afua Juniata     Depression Mother Afua Juniata     Stroke Mother Afua Juniata     Heart disease Father Giorgio Bueno     Diabetes Father Giorgio Bueno     Lymphoma Maternal Grandfather Wilbert Blair     Alcohol abuse Maternal Grandfather Wilbert Blair     Lymphoma Maternal Uncle         Social History[1]    Chief Complaint: No chief complaint on file.      History of present illness:  40-year-old right-hand-dominant female seen for left shoulder pain.  Patient has had pain for several months.  Hurts in the gym with any kind of shoulder exercise.  Gets some popping and clicking around her shoulder blade.  Painful left up above her head.  Range of motion is somewhat limited.  Pain  is a 1/10.  No real treatment.  No trauma.    Prior treatment:  None        Review of Systems:    Constitution: Negative for chills, fever, and sweats.  Negative for unexplained weight loss.    HENT:  Negative for headaches and blurry vision.    Cardiovascular:Negative for chest pain or irregular heart beat. Negative for hypertension.    Respiratory:  Negative for cough and shortness of breath.    Gastrointestinal: Negative for abdominal pain, heartburn, melena, nausea, and vomitting.    Genitourinary:  Negative bladder incontinence and dysuria.    Musculoskeletal:  See HPI    Neurological: Negative for numbness.    Psychiatric/Behavioral: Negative for depression.  The patient is not nervous/anxious.      Endocrine: Negative for polyuria    Hematologic/Lymphatic: Negative for bleeding problem.  Does not bruise/bleed easily.    Skin: Negative for poor would healing and rash      Physical Examination:    Vital Signs:  There were no vitals filed for this visit.    There is no height or weight on file to calculate BMI.    This a well-developed, well nourished patient in no acute distress.  They are alert and oriented and cooperative to examination.  Pt. walks without an antalgic gait.      Examination of the left shoulder shows no rashes or erythema.  Previous healed surgical scars along the undersurface of her arm from the axilla to the mid forearm. There are no masses, ecchymosis, or atrophy. The patient has full range of motion in forward flexion, external rotation, and internal rotation to the mid T-spine. The patient has minimally positive Robles and Neer test.  Mild scapulothoracic crepitus.- [place]'s test. - Speeds test. Nontender to palpation over a.c. joint. Normal stability anteriorly, posteriorly, and negative sulcus sign. Passive range of motion: Forward flexion of 180°, external rotation at 90° of 90°, internal rotation of 50°, and external rotation at 0° of 50°. 2+ radial pulse. Intact axillary, radial,  median and ulnar sensation. 5 out of 5 resisted forward flexion, external rotation, and negative lift off test.      X-rays:  X-rays left shoulder available for review which show no atypical finding         Assessment:  Left scapula pain and rotator cuff syndrome    Plan:  I reviewed the findings with her today.  I recommended formal physical therapy to work on some scapulothoracic balancing as well as rotator cuff conditioning.  Follow up as needed.            All previous pertinent notes including ER visits, physical therapy visits, other orthopedic visits as well as other care for the same musculoskeletal problem were reviewed.  All pertinent lab values and previous imaging was reviewed pertinent to the current visit.    This note was created using [employer] voice recognition software that occasionally misinterpreted phrases or words.    Consult note is delivered via Epic messaging service.           [1]   Social History  Socioeconomic History    Marital status: Single   Tobacco Use    Smoking status: Never     Passive exposure: Never    Smokeless tobacco: Never   Substance and Sexual Activity    Alcohol use: Yes     Alcohol/week: 1.0 standard drink of alcohol     Types: 1 Drinks containing 0.5 oz of alcohol per week     Comment: occasionally    Drug use: Never    Sexual activity: Yes     Partners: Male     Birth control/protection: I.U.D.     Social Drivers of Health     Financial Resource Strain: Low Risk  (10/9/2024)    Overall Financial Resource Strain (CARDIA)     Difficulty of Paying Living Expenses: Not very hard   Food Insecurity: No Food Insecurity (10/9/2024)    Hunger Vital Sign     Worried About Running Out of Food in the Last Year: Never true     Ran Out of Food in the Last Year: Never true   Transportation Needs: No Transportation Needs (3/23/2022)    PRAPARE - Transportation     Lack of Transportation (Medical): No     Lack of Transportation (Non-Medical): No   Physical Activity: Insufficiently  Active (10/9/2024)    Exercise Vital Sign     Days of Exercise per Week: 1 day     Minutes of Exercise per Session: 30 min   Stress: Stress Concern Present (10/9/2024)    [employer] - Occupational Stress Questionnaire     Feeling of Stress : To some extent   Housing Stability: Unknown (10/9/2024)    Housing Stability Vital Sign     Unable to Pay for Housing in the Last Year: No